# Patient Record
Sex: MALE | Race: WHITE | NOT HISPANIC OR LATINO | Employment: OTHER | URBAN - METROPOLITAN AREA
[De-identification: names, ages, dates, MRNs, and addresses within clinical notes are randomized per-mention and may not be internally consistent; named-entity substitution may affect disease eponyms.]

---

## 2017-05-03 ENCOUNTER — OFFICE VISIT (OUTPATIENT)
Dept: CARDIOLOGY | Facility: CLINIC | Age: 82
End: 2017-05-03

## 2017-05-03 VITALS
SYSTOLIC BLOOD PRESSURE: 132 MMHG | BODY MASS INDEX: 30.32 KG/M2 | HEIGHT: 70 IN | WEIGHT: 211.8 LBS | HEART RATE: 53 BPM | DIASTOLIC BLOOD PRESSURE: 78 MMHG

## 2017-05-03 DIAGNOSIS — I48.0 PAROXYSMAL ATRIAL FIBRILLATION (HCC): Primary | ICD-10-CM

## 2017-05-03 DIAGNOSIS — I10 ESSENTIAL HYPERTENSION: ICD-10-CM

## 2017-05-03 PROCEDURE — 93000 ELECTROCARDIOGRAM COMPLETE: CPT | Performed by: INTERNAL MEDICINE

## 2017-05-03 PROCEDURE — 99213 OFFICE O/P EST LOW 20 MIN: CPT | Performed by: INTERNAL MEDICINE

## 2018-01-09 ENCOUNTER — TELEPHONE (OUTPATIENT)
Dept: PAIN MEDICINE | Facility: CLINIC | Age: 83
End: 2018-01-09

## 2018-01-29 PROBLEM — Z79.01 CHRONIC ANTICOAGULATION: Status: ACTIVE | Noted: 2018-01-29

## 2018-01-29 PROBLEM — G57.11 MERALGIA PARESTHETICA OF RIGHT SIDE: Status: ACTIVE | Noted: 2018-01-29

## 2018-01-29 NOTE — PROGRESS NOTES
"Chief Complaint: \"Pain and burning in my right thigh.\"     History of Present Illness:   Patient: Mr. Dennis Coon, 83 y.o. male   Referring physician: Dr. Jorge   Reason for referral: Consultation for intractable acute right thigh pain.   Pain history: Patient reports a 6-week history of pain, which began without incident. Pain has progressed in intensity over the past 6 weeks. Patient fell landing on his right knee 3 weeks before his thigh pain started.  Pain description: constant pain, described as burning sensation.   Radiation of pain: The pain radiates from the right inguinal region into the anterior aspect of his right thigh up to the knee level. Patient also reports some pain in his right calf that starts when he gets out of bed and resolves after a few hours  Pain intensity today: 7/10  Average pain intensity last week: 8/10  Pain intensity ranges from: 7/10 to 8/10  Aggravating factors: Pain increases with clothing touching area.  Alleviating factors: Pain decreases with removal of article of clothing to the area.   Associated symptoms:   Patient reports weakness in the right thigh and right lower extremity from a prior CVA.   Patient denies any new bladder or bowel problems.   Patient reports difficulties with his balance but denies recent falls.      Review of previous therapies and additional medical records:  Dennis Coon has already failed the following measures, including:   Conservative measures: oral analgesics, topical analgesics, massage, ice and heat   Interventional measures: None  Surgical measures: None  Dennis Coon presents with significant comorbidities including hypertension, arrhythmia and CVA, thyroid disorder, prostate cancer, allergies engaged in treatment.  In terms of current analgesics, Dennis Coon takes: Tylenol. Patient failed a trial with gabapentin   I have reviewed Trey Report # [id number] consistent to medication reconciliation.     Review of " Diagnostic Studies: None    Review of Systems   Constitutional: Positive for activity change.   HENT: Positive for hearing loss and tinnitus.    Musculoskeletal: Positive for arthralgias and gait problem.   All other systems reviewed and are negative.     Patient Active Problem List   Diagnosis   • Paroxysmal atrial fibrillation   • Hypertension   • Diabetes mellitus, type 2   • History of TIA (transient ischemic attack)   • History of CVA (cerebrovascular accident)   • BPH (benign prostatic hypertrophy)   • Hypothyroidism   • On continuous oral anticoagulation   • Meralgia paresthetica of right side   • Chronic anticoagulation   • Diabetes mellitus type 2 in obese   • Osteoarthritis of right hip   • Spondylosis of lumbar region without myelopathy or radiculopathy       Past Medical History:   Diagnosis Date   • BPH (benign prostatic hypertrophy) 9/22/2016   • CVA (cerebral infarction) 9/22/2016   • Diabetes mellitus, type 2 9/22/2016   • Extremity pain    • Hypertension 9/22/2016   • Hypothyroidism 9/22/2016   • Nephrolithiasis    • On continuous oral anticoagulation 9/22/2016     Continue Coumadin.   • Paroxysmal atrial fibrillation 9/22/2016   • TIA (transient ischemic attack) 9/22/2016         Past Surgical History:   Procedure Laterality Date   • CATARACT EXTRACTION     • ESOPHAGEAL DILATATION  1997   • MOUTH SURGERY     • OTHER SURGICAL HISTORY      Squamous cell, occipital area, removal.   • SHOULDER SURGERY Right    • TONSILLECTOMY AND ADENOIDECTOMY     • VASECTOMY  1960         Family History   Problem Relation Age of Onset   • No Known Problems Mother    • No Known Problems Father          Social History     Social History   • Marital status:      Spouse name: N/A   • Number of children: N/A   • Years of education: N/A     Social History Main Topics   • Smoking status: Never Smoker   • Smokeless tobacco: Never Used   • Alcohol use No   • Drug use: No   • Sexual activity: Defer     Other Topics  "Concern   • None     Social History Narrative           Current Outpatient Prescriptions:   •  acetaminophen (TYLENOL) 325 MG tablet, Take 650 mg by mouth every 6 (six) hours as needed for mild pain (1-3)., Disp: , Rfl:   •  amLODIPine (NORVASC) 5 MG tablet, Take 5 mg by mouth daily., Disp: , Rfl:   •  aspirin 81 MG EC tablet, Take 81 mg by mouth daily., Disp: , Rfl:   •  bicalutamide (CASODEX) 50 MG chemo tablet, 50 mg daily., Disp: , Rfl:   •  Fexofenadine HCl (ALLEGRA ALLERGY PO), Take  by mouth as needed., Disp: , Rfl:   •  isosorbide mononitrate (IMDUR) 30 MG 24 hr tablet, Take 30 mg by mouth daily., Disp: , Rfl:   •  leuprolide (LUPRON) 22.5 MG injection, Inject 22.5 mg into the shoulder, thigh, or buttocks every 6 (six) months., Disp: , Rfl:   •  levothyroxine (SYNTHROID, LEVOTHROID) 50 MCG tablet, Take  by mouth., Disp: , Rfl:   •  sotalol (BETAPACE) 80 MG tablet, Take 80 mg by mouth 2 (two) times a day., Disp: , Rfl:   •  tamsulosin (FLOMAX) 0.4 MG capsule 24 hr capsule, Take 1 capsule by mouth every night., Disp: , Rfl:   •  warfarin (COUMADIN) 2 MG tablet, , Disp: , Rfl:   •  warfarin (COUMADIN) 5 MG tablet, Take 5 mg by mouth Daily., Disp: , Rfl:       Allergies   Allergen Reactions   • Actos [Pioglitazone]    • Aggrenox [Aspirin-Dipyridamole Er]    • Contrast Dye    • Diovan [Valsartan]    • Iodine    • Lisinopril    • Statins          /78 (BP Location: Left arm, Patient Position: Sitting)  Pulse 56  Temp 97.8 °F (36.6 °C) (Temporal Artery )   Resp 16  Ht 177.8 cm (70\")  Wt 93.9 kg (207 lb)  SpO2 96%  BMI 29.7 kg/m2      Physical Exam:  Constitutional: Patient is oriented to person, place, and time. Vital signs are normal. Patient appears well-developed and well-nourished.   HENT: Head: Normocephalic and atraumatic. Eyes: Conjunctivae and lids are normal. Pupils: Equal, round, reactive to light.   Neck: Trachea normal. Neck supple. No JVD present.   Pulmonary Respiratory effort: No " increased work of breathing or signs of respiratory distress. Auscultation of lungs: Clear to auscultation.   Cardiovascular Auscultation of heart: Normal rate and rhythm, normal S1 and S2, no murmurs.   Abdomen: The abdomen was soft and nontender. Bowel sounds were normal.   Musculoskeletal   Gait and station: Gait evaluation demonstrated shuffling   Lumbar spine: The range of motion of the lumbar spine is full and without pain. Extension, flexion, lateral flexion, rotation of the lumbar spine did not increase or reproduce pain. Lumbar facet joint loading maneuvers are negative.   Anibal and Gaenslen's tests are negative   Piriformis maneuvers are negative   Palpation of the bilateral ischial tuberosities, unrevealing   Palpation of the bilateral greater trochanter, unrevealing   Examination of the Iliotibial band: unrevealing   Hip joints: The range of motion of the left hip joint is full and without pain, limited on the right to flexion and internal rotation   Neurological: Patient is alert and oriented to person, place, and time. Speech: speech is normal. Cortical function: Normal mental status.   Cranial nerves: Cranial nerves 2-12 intact.   Reflex Scores:  Right patellar: 0+  Left patellar: 0+  Right achilles: 0+  Left achilles: 0+  Motor strength: 5/5, except right leg overall 4/5  Motor Tone: normal tone.   Involuntary movements: none.   Superficial/Primitive Reflexes: primitive reflexes were absent.   Right Lai: absent  Left Lai: absent  Right ankle clonus: absent  Left ankle clonus: absent   Negative long tract signs. Straight leg raising test is negative. Femoral stretch sign is negative.   Sensation: No sensory loss. Sensory exam: intact to light touch, intact pain and temperature sensation, intact vibration sensation and normal proprioception. There is some hyperesthesia in his right thigh  Coordination: Normal finger to nose and heel to shin. Normal balance and negative. Romberg's sign  negative.   Skin and subcutaneous tissue: Skin is warm and intact. No rash noted. No cyanosis.   Psychiatric: Judgment and insight: Normal. Orientation to person, place and time: Normal. Recent and remote memory: Intact. Mood and affect: Normal.     ASSESSMENT:   1. Meralgia paresthetica of right side    2. Osteoarthritis of right hip, unspecified osteoarthritis type    3. Spondylosis of lumbar region without myelopathy or radiculopathy    4. Diabetes mellitus type 2 in obese    5. Paroxysmal atrial fibrillation    6. History of TIA (transient ischemic attack)    7. History of CVA (cerebrovascular accident)    8. On continuous oral anticoagulation      PLAN/MEDICAL DECISION MAKING: I had a lengthy conversation with . Dennis Coon regarding his chronic pain condition and potential therapeutic options including risks, benefits, alternative therapies, to name a few. Patient has failed to obtain pain relief with conservative measures, as referenced above. I have reviewed all available patient's medical records as well as previous therapies as referenced above.  Therefore, I have proposed the following plan:  1. Diagnostic studies:  A. X-rays of the lumbar spine full views with flexion and extension  B. X-rays of the right hip full views  2. Pharmacological measures: Reviewed. Discussed.   A.  Patient takes Tylenol.   B. Trial with prilocaine 2%, lidocaine 10%, imipramine 3%, capsaicin 0.001% and mannitol 20%  cream, apply 1 to 2 grams of cream to the affected areas every 4 to 6 hours as needed  3. Interventional pain management measures: Patient will be scheduled for diagnostic and therapeutic right lateral femoral cutaneous nerve block under ultrasound and fluoroscopic guidance by hydrodissection technique.  In case of any neuraxial blocks, patient will need to stop warfarin (Coumadin) at least 5 days between last dose and procedure and when INR < 1.5  4. Long-term rehabilitation efforts:  A. The patient has a  history of falls. I did complete a risk assessment for falls. Fall precautions: Patient has been instructed regarding universal fall precautions, such as;   · Using gait aids a rolling walker at the appropriate height at all times for ambulation  · Removing all area rugs and coffee tables to create a safe environment at home  · Ensure clean, dry floors  · Wearing supportive footwear and properly fitting clothing  · Ensure bed/chair is appropriate height and patient's feet can touch the floor  · Using a shower transfer bench  · Using walk-in shower and having shower safety bars installed  · Ensure proper lighting, minimize glare  · Have nightlights operational and in use  · Participation in an exercise program for gait training, balance training and strength  · Ensure proper compliance and organization of medications to avoid errors   · Avoid use of over the counter sedatives and alcohol consumption  · Ensure easy access to call bell, glasses, TV control, telephone  · Ensure glasses/hearing aids are in use or close by (on top of night table)  B.   Patient will start a comprehensive physical therapy program for water therapy, core strengthening, gait and balance training, neurodynamics and desensitization techniques   C.  Contrast therapy: Apply ice-packs for 15-20 minutes, followed by heating pads for 15-20 minutes to affected area   D.  Referral to [facility]  5.  The patient and his family have been instructed to contact my office with any questions or difficulties. The patient understands the plan and agrees to proceed accordingly.       Patient Care Team:  Emerita Jorge MD as PCP - General  Nestor Carrillo MD as Consulting Physician (Pain Medicine)  Rambo Mclean MD as Consulting Physician (Cardiology)     New Medications Ordered This Visit   Medications   • levothyroxine (SYNTHROID, LEVOTHROID) 50 MCG tablet     Sig: Take  by mouth.   • warfarin (COUMADIN) 2 MG  tablet   • warfarin (COUMADIN) 5 MG tablet     Sig: Take 5 mg by mouth Daily.         No future appointments.      Nestor Carrillo MD     EMR Dragon/Transcription disclaimer:  Much of this encounter note is an electronic transcription of spoken language to printed text. Electronic transcription of spoken language may permit erroneous, or at times, nonsensical words or phrases to be inadvertently transcribed. Although I have reviewed the note for such errors, some may still exist.

## 2018-01-30 ENCOUNTER — OFFICE VISIT (OUTPATIENT)
Dept: PAIN MEDICINE | Facility: CLINIC | Age: 83
End: 2018-01-30

## 2018-01-30 ENCOUNTER — HOSPITAL ENCOUNTER (OUTPATIENT)
Dept: GENERAL RADIOLOGY | Facility: HOSPITAL | Age: 83
Discharge: HOME | End: 2018-01-30
Attending: ANESTHESIOLOGY | Admitting: ANESTHESIOLOGY

## 2018-01-30 VITALS
DIASTOLIC BLOOD PRESSURE: 78 MMHG | RESPIRATION RATE: 16 BRPM | HEART RATE: 56 BPM | WEIGHT: 207 LBS | TEMPERATURE: 97.8 F | HEIGHT: 70 IN | BODY MASS INDEX: 29.63 KG/M2 | SYSTOLIC BLOOD PRESSURE: 128 MMHG | OXYGEN SATURATION: 96 %

## 2018-01-30 DIAGNOSIS — E11.69 DIABETES MELLITUS TYPE 2 IN OBESE (HCC): ICD-10-CM

## 2018-01-30 DIAGNOSIS — M47.816 SPONDYLOSIS OF LUMBAR REGION WITHOUT MYELOPATHY OR RADICULOPATHY: ICD-10-CM

## 2018-01-30 DIAGNOSIS — E66.9 DIABETES MELLITUS TYPE 2 IN OBESE (HCC): ICD-10-CM

## 2018-01-30 DIAGNOSIS — M16.11 OSTEOARTHRITIS OF RIGHT HIP, UNSPECIFIED OSTEOARTHRITIS TYPE: ICD-10-CM

## 2018-01-30 DIAGNOSIS — G57.11 MERALGIA PARESTHETICA OF RIGHT SIDE: ICD-10-CM

## 2018-01-30 DIAGNOSIS — I48.0 PAROXYSMAL ATRIAL FIBRILLATION (HCC): ICD-10-CM

## 2018-01-30 DIAGNOSIS — Z86.73 HISTORY OF CVA (CEREBROVASCULAR ACCIDENT): ICD-10-CM

## 2018-01-30 DIAGNOSIS — Z79.01 ON CONTINUOUS ORAL ANTICOAGULATION: ICD-10-CM

## 2018-01-30 DIAGNOSIS — Z86.73 HISTORY OF TIA (TRANSIENT ISCHEMIC ATTACK): ICD-10-CM

## 2018-01-30 PROCEDURE — 72114 X-RAY EXAM L-S SPINE BENDING: CPT

## 2018-01-30 PROCEDURE — 99203 OFFICE O/P NEW LOW 30 MIN: CPT | Performed by: ANESTHESIOLOGY

## 2018-01-30 PROCEDURE — 73502 X-RAY EXAM HIP UNI 2-3 VIEWS: CPT

## 2018-01-30 RX ORDER — WARFARIN SODIUM 2 MG/1
TABLET ORAL
Status: ON HOLD | COMMUNITY
Start: 2018-01-10 | End: 2018-09-26

## 2018-01-30 RX ORDER — LEVOTHYROXINE SODIUM 0.05 MG/1
50 TABLET ORAL DAILY
COMMUNITY
Start: 2015-10-02

## 2018-01-30 RX ORDER — WARFARIN SODIUM 5 MG/1
5 TABLET ORAL
Status: ON HOLD | COMMUNITY
End: 2018-09-26

## 2018-01-30 RX ORDER — EMOLLIENT BASE
CREAM (GRAM) TOPICAL
Qty: 30 G | Status: SHIPPED | OUTPATIENT
Start: 2018-01-30

## 2018-02-05 ENCOUNTER — OUTSIDE FACILITY SERVICE (OUTPATIENT)
Dept: PAIN MEDICINE | Facility: CLINIC | Age: 83
End: 2018-02-05

## 2018-02-05 PROCEDURE — 99152 MOD SED SAME PHYS/QHP 5/>YRS: CPT | Performed by: ANESTHESIOLOGY

## 2018-02-05 PROCEDURE — 64450 NJX AA&/STRD OTHER PN/BRANCH: CPT | Performed by: ANESTHESIOLOGY

## 2018-02-05 PROCEDURE — 76942 ECHO GUIDE FOR BIOPSY: CPT | Performed by: ANESTHESIOLOGY

## 2018-09-26 ENCOUNTER — HOSPITAL ENCOUNTER (OUTPATIENT)
Facility: HOSPITAL | Age: 83
Setting detail: OBSERVATION
Discharge: HOME | End: 2018-09-28
Attending: EMERGENCY MEDICINE | Admitting: EMERGENCY MEDICINE

## 2018-09-26 ENCOUNTER — APPOINTMENT (OUTPATIENT)
Dept: GENERAL RADIOLOGY | Facility: HOSPITAL | Age: 83
End: 2018-09-26

## 2018-09-26 ENCOUNTER — APPOINTMENT (OUTPATIENT)
Dept: CARDIOLOGY | Facility: HOSPITAL | Age: 83
End: 2018-09-26

## 2018-09-26 DIAGNOSIS — I48.91 ATRIAL FIBRILLATION, UNSPECIFIED TYPE (HCC): ICD-10-CM

## 2018-09-26 DIAGNOSIS — I21.4 NSTEMI (NON-ST ELEVATED MYOCARDIAL INFARCTION) (HCC): ICD-10-CM

## 2018-09-26 DIAGNOSIS — R07.9 CHEST PAIN, UNSPECIFIED TYPE: Primary | ICD-10-CM

## 2018-09-26 DIAGNOSIS — N28.9 RENAL INSUFFICIENCY: ICD-10-CM

## 2018-09-26 DIAGNOSIS — I48.0 PAROXYSMAL ATRIAL FIBRILLATION (HCC): ICD-10-CM

## 2018-09-26 DIAGNOSIS — E66.9 DIABETES MELLITUS TYPE 2 IN OBESE (HCC): ICD-10-CM

## 2018-09-26 DIAGNOSIS — E11.69 DIABETES MELLITUS TYPE 2 IN OBESE (HCC): ICD-10-CM

## 2018-09-26 PROBLEM — I20.8 ANGINA AT REST (HCC): Status: ACTIVE | Noted: 2018-09-26

## 2018-09-26 PROBLEM — N18.9 CKD (CHRONIC KIDNEY DISEASE): Status: ACTIVE | Noted: 2018-09-26

## 2018-09-26 LAB
ALBUMIN SERPL-MCNC: 4.3 G/DL (ref 3.2–4.8)
ALBUMIN/GLOB SERPL: 1.7 G/DL (ref 1.5–2.5)
ALP SERPL-CCNC: 73 U/L (ref 25–100)
ALT SERPL W P-5'-P-CCNC: 15 U/L (ref 7–40)
ANION GAP SERPL CALCULATED.3IONS-SCNC: 10 MMOL/L (ref 3–11)
APTT PPP: 41.2 SECONDS (ref 24–31)
ARTICHOKE IGE QN: 170 MG/DL (ref 0–130)
AST SERPL-CCNC: 15 U/L (ref 0–33)
BASOPHILS # BLD AUTO: 0.01 10*3/MM3 (ref 0–0.2)
BASOPHILS NFR BLD AUTO: 0.2 % (ref 0–1)
BH CV ECHO MEAS - AO MAX PG (FULL): 22 MMHG
BH CV ECHO MEAS - AO MAX PG: 25 MMHG
BH CV ECHO MEAS - AO MEAN PG (FULL): 13.6 MMHG
BH CV ECHO MEAS - AO MEAN PG: 15.1 MMHG
BH CV ECHO MEAS - AO ROOT AREA (BSA CORRECTED): 1.4
BH CV ECHO MEAS - AO ROOT AREA: 7.1 CM^2
BH CV ECHO MEAS - AO ROOT DIAM: 3 CM
BH CV ECHO MEAS - AO V2 MAX: 249.8 CM/SEC
BH CV ECHO MEAS - AO V2 MEAN: 183.8 CM/SEC
BH CV ECHO MEAS - AO V2 VTI: 63 CM
BH CV ECHO MEAS - AVA(I,A): 1.2 CM^2
BH CV ECHO MEAS - AVA(I,D): 1.2 CM^2
BH CV ECHO MEAS - AVA(V,A): 1.2 CM^2
BH CV ECHO MEAS - AVA(V,D): 1.2 CM^2
BH CV ECHO MEAS - BSA(HAYCOCK): 2.1 M^2
BH CV ECHO MEAS - BSA: 2.1 M^2
BH CV ECHO MEAS - BZI_BMI: 28.7 KILOGRAMS/M^2
BH CV ECHO MEAS - BZI_METRIC_HEIGHT: 177.8 CM
BH CV ECHO MEAS - BZI_METRIC_WEIGHT: 90.7 KG
BH CV ECHO MEAS - EDV(CUBED): 113.5 ML
BH CV ECHO MEAS - EDV(MOD-SP4): 87 ML
BH CV ECHO MEAS - EDV(TEICH): 109.7 ML
BH CV ECHO MEAS - EF(CUBED): 65.5 %
BH CV ECHO MEAS - EF(MOD-BP): 0 %
BH CV ECHO MEAS - EF(MOD-SP4): 66.7 %
BH CV ECHO MEAS - EF(TEICH): 56.9 %
BH CV ECHO MEAS - ESV(CUBED): 39.2 ML
BH CV ECHO MEAS - ESV(MOD-SP4): 29 ML
BH CV ECHO MEAS - ESV(TEICH): 47.3 ML
BH CV ECHO MEAS - FS: 29.8 %
BH CV ECHO MEAS - IVS/LVPW: 0.88
BH CV ECHO MEAS - IVSD: 0.88 CM
BH CV ECHO MEAS - LA DIMENSION: 3.7 CM
BH CV ECHO MEAS - LA/AO: 1.2
BH CV ECHO MEAS - LAD MAJOR: 6 CM
BH CV ECHO MEAS - LAT PEAK E' VEL: 7.3 CM/SEC
BH CV ECHO MEAS - LATERAL E/E' RATIO: 10
BH CV ECHO MEAS - LV DIASTOLIC VOL/BSA (35-75): 41.7 ML/M^2
BH CV ECHO MEAS - LV MASS(C)D: 159.4 GRAMS
BH CV ECHO MEAS - LV MASS(C)DI: 76.4 GRAMS/M^2
BH CV ECHO MEAS - LV MAX PG: 3 MMHG
BH CV ECHO MEAS - LV MEAN PG: 1.5 MMHG
BH CV ECHO MEAS - LV SYSTOLIC VOL/BSA (12-30): 13.9 ML/M^2
BH CV ECHO MEAS - LV V1 MAX: 86.2 CM/SEC
BH CV ECHO MEAS - LV V1 MEAN: 57.5 CM/SEC
BH CV ECHO MEAS - LV V1 VTI: 21.9 CM
BH CV ECHO MEAS - LVIDD: 4.8 CM
BH CV ECHO MEAS - LVIDS: 3.4 CM
BH CV ECHO MEAS - LVLD AP4: 7.3 CM
BH CV ECHO MEAS - LVLS AP4: 6.2 CM
BH CV ECHO MEAS - LVOT AREA (M): 3.5 CM^2
BH CV ECHO MEAS - LVOT AREA: 3.4 CM^2
BH CV ECHO MEAS - LVOT DIAM: 2.1 CM
BH CV ECHO MEAS - LVPWD: 1 CM
BH CV ECHO MEAS - MED PEAK E' VEL: 6.8 CM/SEC
BH CV ECHO MEAS - MEDIAL E/E' RATIO: 10.8
BH CV ECHO MEAS - MV A MAX VEL: 91.8 CM/SEC
BH CV ECHO MEAS - MV DEC TIME: 0.29 SEC
BH CV ECHO MEAS - MV E MAX VEL: 75.3 CM/SEC
BH CV ECHO MEAS - MV E/A: 0.82
BH CV ECHO MEAS - PA ACC SLOPE: 494.2 CM/SEC^2
BH CV ECHO MEAS - PA ACC TIME: 0.15 SEC
BH CV ECHO MEAS - PA MAX PG: 6.7 MMHG
BH CV ECHO MEAS - PA PR(ACCEL): 12.5 MMHG
BH CV ECHO MEAS - PA V2 MAX: 129.2 CM/SEC
BH CV ECHO MEAS - RAP SYSTOLE: 3 MMHG
BH CV ECHO MEAS - RVSP: 23 MMHG
BH CV ECHO MEAS - SI(AO): 215.6 ML/M^2
BH CV ECHO MEAS - SI(CUBED): 35.6 ML/M^2
BH CV ECHO MEAS - SI(LVOT): 35.7 ML/M^2
BH CV ECHO MEAS - SI(MOD-SP4): 27.8 ML/M^2
BH CV ECHO MEAS - SI(TEICH): 29.9 ML/M^2
BH CV ECHO MEAS - SV(AO): 450.1 ML
BH CV ECHO MEAS - SV(CUBED): 74.3 ML
BH CV ECHO MEAS - SV(LVOT): 74.5 ML
BH CV ECHO MEAS - SV(MOD-SP4): 58 ML
BH CV ECHO MEAS - SV(TEICH): 62.4 ML
BH CV ECHO MEAS - TR MAX PG: 20 MMHG
BH CV ECHO MEAS - TR MAX VEL: 223.9 CM/SEC
BH CV ECHO MEASUREMENTS AVERAGE E/E' RATIO: 10.68
BH CV VAS BP RIGHT ARM: NORMAL MMHG
BILIRUB SERPL-MCNC: 0.3 MG/DL (ref 0.3–1.2)
BNP SERPL-MCNC: 101 PG/ML (ref 0–100)
BUN BLD-MCNC: 41 MG/DL (ref 9–23)
BUN/CREAT SERPL: 24 (ref 7–25)
CALCIUM SPEC-SCNC: 9.6 MG/DL (ref 8.7–10.4)
CHLORIDE SERPL-SCNC: 107 MMOL/L (ref 99–109)
CHOLEST SERPL-MCNC: 241 MG/DL (ref 0–200)
CO2 SERPL-SCNC: 20 MMOL/L (ref 20–31)
CREAT BLD-MCNC: 1.71 MG/DL (ref 0.6–1.3)
DEPRECATED RDW RBC AUTO: 44.7 FL (ref 37–54)
EOSINOPHIL # BLD AUTO: 0 10*3/MM3 (ref 0–0.3)
EOSINOPHIL NFR BLD AUTO: 0 % (ref 0–3)
ERYTHROCYTE [DISTWIDTH] IN BLOOD BY AUTOMATED COUNT: 13.4 % (ref 11.3–14.5)
GFR SERPL CREATININE-BSD FRML MDRD: 38 ML/MIN/1.73
GLOBULIN UR ELPH-MCNC: 2.6 GM/DL
GLUCOSE BLD-MCNC: 267 MG/DL (ref 70–100)
GLUCOSE BLDC GLUCOMTR-MCNC: 207 MG/DL (ref 70–130)
GLUCOSE BLDC GLUCOMTR-MCNC: 215 MG/DL (ref 70–130)
HBA1C MFR BLD: 7.2 % (ref 4.8–5.6)
HCT VFR BLD AUTO: 33.8 % (ref 38.9–50.9)
HDLC SERPL-MCNC: 41 MG/DL (ref 40–60)
HGB BLD-MCNC: 11.4 G/DL (ref 13.1–17.5)
HOLD SPECIMEN: NORMAL
HOLD SPECIMEN: NORMAL
IMM GRANULOCYTES # BLD: 0.02 10*3/MM3 (ref 0–0.03)
IMM GRANULOCYTES NFR BLD: 0.3 % (ref 0–0.6)
INR PPP: 2.1 (ref 0.91–1.09)
INR PPP: 2.1 (ref 0.91–1.09)
LEFT ATRIUM VOLUME INDEX: 33 ML/M^2
LEFT ATRIUM VOLUME: 70 CM3
LIPASE SERPL-CCNC: 22 U/L (ref 6–51)
LYMPHOCYTES # BLD AUTO: 0.42 10*3/MM3 (ref 0.6–4.8)
LYMPHOCYTES NFR BLD AUTO: 6.5 % (ref 24–44)
MAXIMAL PREDICTED HEART RATE: 137 BPM
MCH RBC QN AUTO: 31 PG (ref 27–31)
MCHC RBC AUTO-ENTMCNC: 33.7 G/DL (ref 32–36)
MCV RBC AUTO: 91.8 FL (ref 80–99)
MONOCYTES # BLD AUTO: 0.19 10*3/MM3 (ref 0–1)
MONOCYTES NFR BLD AUTO: 3 % (ref 0–12)
NEUTROPHILS # BLD AUTO: 5.8 10*3/MM3 (ref 1.5–8.3)
NEUTROPHILS NFR BLD AUTO: 90.3 % (ref 41–71)
PLATELET # BLD AUTO: 237 10*3/MM3 (ref 150–450)
PMV BLD AUTO: 10.2 FL (ref 6–12)
POTASSIUM BLD-SCNC: 4.4 MMOL/L (ref 3.5–5.5)
PROT SERPL-MCNC: 6.9 G/DL (ref 5.7–8.2)
PROTHROMBIN TIME: 22.1 SECONDS (ref 9.6–11.5)
PROTHROMBIN TIME: 22.1 SECONDS (ref 9.6–11.5)
RBC # BLD AUTO: 3.68 10*6/MM3 (ref 4.2–5.76)
SODIUM BLD-SCNC: 137 MMOL/L (ref 132–146)
STRESS TARGET HR: 116 BPM
TRIGL SERPL-MCNC: 162 MG/DL (ref 0–150)
TROPONIN I SERPL-MCNC: 0 NG/ML (ref 0–0.07)
TROPONIN I SERPL-MCNC: 0 NG/ML (ref 0–0.07)
TROPONIN I SERPL-MCNC: 0.1 NG/ML
TROPONIN I SERPL-MCNC: 0.85 NG/ML
TROPONIN I SERPL-MCNC: 1.86 NG/ML
WBC NRBC COR # BLD: 6.42 10*3/MM3 (ref 3.5–10.8)
WHOLE BLOOD HOLD SPECIMEN: NORMAL
WHOLE BLOOD HOLD SPECIMEN: NORMAL

## 2018-09-26 PROCEDURE — 96374 THER/PROPH/DIAG INJ IV PUSH: CPT

## 2018-09-26 PROCEDURE — 85610 PROTHROMBIN TIME: CPT | Performed by: NURSE PRACTITIONER

## 2018-09-26 PROCEDURE — 93005 ELECTROCARDIOGRAM TRACING: CPT

## 2018-09-26 PROCEDURE — 93303 ECHO TRANSTHORACIC: CPT | Performed by: INTERNAL MEDICINE

## 2018-09-26 PROCEDURE — 25010000002 MORPHINE SULFATE (PF) 2 MG/ML SOLUTION: Performed by: INTERNAL MEDICINE

## 2018-09-26 PROCEDURE — 82962 GLUCOSE BLOOD TEST: CPT

## 2018-09-26 PROCEDURE — 84484 ASSAY OF TROPONIN QUANT: CPT

## 2018-09-26 PROCEDURE — G0378 HOSPITAL OBSERVATION PER HR: HCPCS

## 2018-09-26 PROCEDURE — 96376 TX/PRO/DX INJ SAME DRUG ADON: CPT

## 2018-09-26 PROCEDURE — 63710000001 INSULIN LISPRO (HUMAN) PER 5 UNITS: Performed by: INTERNAL MEDICINE

## 2018-09-26 PROCEDURE — 83036 HEMOGLOBIN GLYCOSYLATED A1C: CPT | Performed by: INTERNAL MEDICINE

## 2018-09-26 PROCEDURE — 80053 COMPREHEN METABOLIC PANEL: CPT | Performed by: EMERGENCY MEDICINE

## 2018-09-26 PROCEDURE — 83690 ASSAY OF LIPASE: CPT | Performed by: EMERGENCY MEDICINE

## 2018-09-26 PROCEDURE — 99285 EMERGENCY DEPT VISIT HI MDM: CPT

## 2018-09-26 PROCEDURE — 99220 PR INITIAL OBSERVATION CARE/DAY 70 MINUTES: CPT | Performed by: INTERNAL MEDICINE

## 2018-09-26 PROCEDURE — 84484 ASSAY OF TROPONIN QUANT: CPT | Performed by: INTERNAL MEDICINE

## 2018-09-26 PROCEDURE — 96375 TX/PRO/DX INJ NEW DRUG ADDON: CPT

## 2018-09-26 PROCEDURE — 25010000002 ONDANSETRON PER 1 MG: Performed by: NURSE PRACTITIONER

## 2018-09-26 PROCEDURE — 25010000002 MORPHINE SULFATE (PF) 2 MG/ML SOLUTION: Performed by: EMERGENCY MEDICINE

## 2018-09-26 PROCEDURE — 93306 TTE W/DOPPLER COMPLETE: CPT

## 2018-09-26 PROCEDURE — 93325 DOPPLER ECHO COLOR FLOW MAPG: CPT | Performed by: INTERNAL MEDICINE

## 2018-09-26 PROCEDURE — 80061 LIPID PANEL: CPT | Performed by: INTERNAL MEDICINE

## 2018-09-26 PROCEDURE — 99223 1ST HOSP IP/OBS HIGH 75: CPT | Performed by: INTERNAL MEDICINE

## 2018-09-26 PROCEDURE — 93005 ELECTROCARDIOGRAM TRACING: CPT | Performed by: EMERGENCY MEDICINE

## 2018-09-26 PROCEDURE — 83880 ASSAY OF NATRIURETIC PEPTIDE: CPT | Performed by: EMERGENCY MEDICINE

## 2018-09-26 PROCEDURE — 93005 ELECTROCARDIOGRAM TRACING: CPT | Performed by: INTERNAL MEDICINE

## 2018-09-26 PROCEDURE — 71045 X-RAY EXAM CHEST 1 VIEW: CPT

## 2018-09-26 PROCEDURE — 85730 THROMBOPLASTIN TIME PARTIAL: CPT | Performed by: NURSE PRACTITIONER

## 2018-09-26 PROCEDURE — 85025 COMPLETE CBC W/AUTO DIFF WBC: CPT | Performed by: EMERGENCY MEDICINE

## 2018-09-26 PROCEDURE — 93010 ELECTROCARDIOGRAM REPORT: CPT | Performed by: INTERNAL MEDICINE

## 2018-09-26 PROCEDURE — 93320 DOPPLER ECHO COMPLETE: CPT | Performed by: INTERNAL MEDICINE

## 2018-09-26 RX ORDER — SOTALOL HYDROCHLORIDE 80 MG/1
80 TABLET ORAL EVERY 12 HOURS SCHEDULED
Status: DISCONTINUED | OUTPATIENT
Start: 2018-09-26 | End: 2018-09-28

## 2018-09-26 RX ORDER — NICOTINE POLACRILEX 4 MG
15 LOZENGE BUCCAL
Status: DISCONTINUED | OUTPATIENT
Start: 2018-09-26 | End: 2018-09-28 | Stop reason: HOSPADM

## 2018-09-26 RX ORDER — NICOTINE POLACRILEX 4 MG
15 LOZENGE BUCCAL
Status: DISCONTINUED | OUTPATIENT
Start: 2018-09-26 | End: 2018-09-27

## 2018-09-26 RX ORDER — CLOPIDOGREL BISULFATE 75 MG/1
75 TABLET ORAL DAILY
Status: DISCONTINUED | OUTPATIENT
Start: 2018-09-26 | End: 2018-09-28 | Stop reason: HOSPADM

## 2018-09-26 RX ORDER — SODIUM CHLORIDE 0.9 % (FLUSH) 0.9 %
10 SYRINGE (ML) INJECTION AS NEEDED
Status: DISCONTINUED | OUTPATIENT
Start: 2018-09-26 | End: 2018-09-28 | Stop reason: HOSPADM

## 2018-09-26 RX ORDER — NALOXONE HCL 0.4 MG/ML
0.4 VIAL (ML) INJECTION
Status: DISCONTINUED | OUTPATIENT
Start: 2018-09-26 | End: 2018-09-28 | Stop reason: HOSPADM

## 2018-09-26 RX ORDER — ALUMINA, MAGNESIA, AND SIMETHICONE 2400; 2400; 240 MG/30ML; MG/30ML; MG/30ML
15 SUSPENSION ORAL ONCE
Status: COMPLETED | OUTPATIENT
Start: 2018-09-26 | End: 2018-09-26

## 2018-09-26 RX ORDER — PANTOPRAZOLE SODIUM 40 MG/10ML
40 INJECTION, POWDER, LYOPHILIZED, FOR SOLUTION INTRAVENOUS
Status: DISCONTINUED | OUTPATIENT
Start: 2018-09-26 | End: 2018-09-27

## 2018-09-26 RX ORDER — DEXTROSE MONOHYDRATE 25 G/50ML
25 INJECTION, SOLUTION INTRAVENOUS
Status: DISCONTINUED | OUTPATIENT
Start: 2018-09-26 | End: 2018-09-28 | Stop reason: HOSPADM

## 2018-09-26 RX ORDER — ACETAMINOPHEN 325 MG/1
650 TABLET ORAL EVERY 6 HOURS PRN
Status: DISCONTINUED | OUTPATIENT
Start: 2018-09-26 | End: 2018-09-28 | Stop reason: HOSPADM

## 2018-09-26 RX ORDER — MORPHINE SULFATE 2 MG/ML
2 INJECTION, SOLUTION INTRAMUSCULAR; INTRAVENOUS ONCE
Status: COMPLETED | OUTPATIENT
Start: 2018-09-26 | End: 2018-09-26

## 2018-09-26 RX ORDER — WARFARIN SODIUM 5 MG/1
5 TABLET ORAL SEE ADMIN INSTRUCTIONS
COMMUNITY
End: 2018-09-28 | Stop reason: HOSPADM

## 2018-09-26 RX ORDER — SODIUM CHLORIDE 0.9 % (FLUSH) 0.9 %
1-10 SYRINGE (ML) INJECTION AS NEEDED
Status: DISCONTINUED | OUTPATIENT
Start: 2018-09-26 | End: 2018-09-28 | Stop reason: HOSPADM

## 2018-09-26 RX ORDER — MORPHINE SULFATE 2 MG/ML
2 INJECTION, SOLUTION INTRAMUSCULAR; INTRAVENOUS EVERY 4 HOURS PRN
Status: DISCONTINUED | OUTPATIENT
Start: 2018-09-26 | End: 2018-09-28 | Stop reason: HOSPADM

## 2018-09-26 RX ORDER — ISOSORBIDE MONONITRATE 30 MG/1
30 TABLET, EXTENDED RELEASE ORAL DAILY
Status: DISCONTINUED | OUTPATIENT
Start: 2018-09-26 | End: 2018-09-26

## 2018-09-26 RX ORDER — ASPIRIN 81 MG/1
81 TABLET ORAL DAILY
Status: DISCONTINUED | OUTPATIENT
Start: 2018-09-26 | End: 2018-09-26

## 2018-09-26 RX ORDER — GLIPIZIDE 5 MG/1
2.5 TABLET ORAL
Status: DISCONTINUED | OUTPATIENT
Start: 2018-09-27 | End: 2018-09-26

## 2018-09-26 RX ORDER — ASPIRIN 81 MG/1
324 TABLET, CHEWABLE ORAL ONCE
Status: DISCONTINUED | OUTPATIENT
Start: 2018-09-26 | End: 2018-09-26

## 2018-09-26 RX ORDER — ONDANSETRON 2 MG/ML
4 INJECTION INTRAMUSCULAR; INTRAVENOUS ONCE
Status: COMPLETED | OUTPATIENT
Start: 2018-09-26 | End: 2018-09-26

## 2018-09-26 RX ORDER — WARFARIN SODIUM 5 MG/1
5 TABLET ORAL
Status: DISCONTINUED | OUTPATIENT
Start: 2018-09-26 | End: 2018-09-28 | Stop reason: HOSPADM

## 2018-09-26 RX ORDER — DEXTROSE MONOHYDRATE 25 G/50ML
25 INJECTION, SOLUTION INTRAVENOUS
Status: DISCONTINUED | OUTPATIENT
Start: 2018-09-26 | End: 2018-09-27

## 2018-09-26 RX ORDER — AMLODIPINE BESYLATE 5 MG/1
5 TABLET ORAL DAILY
Status: DISCONTINUED | OUTPATIENT
Start: 2018-09-26 | End: 2018-09-26

## 2018-09-26 RX ORDER — PANTOPRAZOLE SODIUM 40 MG/10ML
40 INJECTION, POWDER, LYOPHILIZED, FOR SOLUTION INTRAVENOUS
Status: DISCONTINUED | OUTPATIENT
Start: 2018-09-26 | End: 2018-09-26

## 2018-09-26 RX ORDER — WARFARIN SODIUM 4 MG/1
4 TABLET ORAL SEE ADMIN INSTRUCTIONS
COMMUNITY
End: 2018-09-28 | Stop reason: HOSPADM

## 2018-09-26 RX ORDER — AMLODIPINE BESYLATE 10 MG/1
10 TABLET ORAL DAILY
Status: DISCONTINUED | OUTPATIENT
Start: 2018-09-27 | End: 2018-09-28 | Stop reason: HOSPADM

## 2018-09-26 RX ORDER — ISOSORBIDE MONONITRATE 60 MG/1
60 TABLET, EXTENDED RELEASE ORAL DAILY
Status: DISCONTINUED | OUTPATIENT
Start: 2018-09-27 | End: 2018-09-28 | Stop reason: HOSPADM

## 2018-09-26 RX ORDER — LEVOTHYROXINE SODIUM 0.05 MG/1
50 TABLET ORAL
Status: DISCONTINUED | OUTPATIENT
Start: 2018-09-26 | End: 2018-09-28 | Stop reason: HOSPADM

## 2018-09-26 RX ADMIN — SOTALOL HYDROCHLORIDE 80 MG: 80 TABLET ORAL at 08:09

## 2018-09-26 RX ADMIN — SOTALOL HYDROCHLORIDE 80 MG: 80 TABLET ORAL at 20:27

## 2018-09-26 RX ADMIN — ASPIRIN 81 MG: 81 TABLET, COATED ORAL at 08:09

## 2018-09-26 RX ADMIN — WARFARIN SODIUM 5 MG: 5 TABLET ORAL at 17:57

## 2018-09-26 RX ADMIN — CLOPIDOGREL BISULFATE 75 MG: 75 TABLET ORAL at 13:05

## 2018-09-26 RX ADMIN — INSULIN LISPRO 3 UNITS: 100 INJECTION, SOLUTION INTRAVENOUS; SUBCUTANEOUS at 20:27

## 2018-09-26 RX ADMIN — LEVOTHYROXINE SODIUM 50 MCG: 50 TABLET ORAL at 08:09

## 2018-09-26 RX ADMIN — ONDANSETRON 4 MG: 2 INJECTION INTRAMUSCULAR; INTRAVENOUS at 05:36

## 2018-09-26 RX ADMIN — LIDOCAINE HYDROCHLORIDE 15 ML: 20 SOLUTION ORAL; TOPICAL at 04:45

## 2018-09-26 RX ADMIN — ISOSORBIDE MONONITRATE 30 MG: 30 TABLET, EXTENDED RELEASE ORAL at 08:09

## 2018-09-26 RX ADMIN — NITROGLYCERIN 0.5 INCH: 20 OINTMENT TOPICAL at 03:58

## 2018-09-26 RX ADMIN — ALUMINUM HYDROXIDE, MAGNESIUM HYDROXIDE, AND DIMETHICONE 15 ML: 400; 400; 40 SUSPENSION ORAL at 04:45

## 2018-09-26 RX ADMIN — MORPHINE SULFATE 2 MG: 2 INJECTION, SOLUTION INTRAMUSCULAR; INTRAVENOUS at 06:59

## 2018-09-26 RX ADMIN — PANTOPRAZOLE SODIUM 40 MG: 40 INJECTION, POWDER, FOR SOLUTION INTRAVENOUS at 05:38

## 2018-09-26 RX ADMIN — MORPHINE SULFATE 2 MG: 2 INJECTION, SOLUTION INTRAMUSCULAR; INTRAVENOUS at 05:34

## 2018-09-27 LAB
ANION GAP SERPL CALCULATED.3IONS-SCNC: 10 MMOL/L (ref 3–11)
BUN BLD-MCNC: 35 MG/DL (ref 9–23)
BUN/CREAT SERPL: 22.6 (ref 7–25)
CALCIUM SPEC-SCNC: 9.1 MG/DL (ref 8.7–10.4)
CHLORIDE SERPL-SCNC: 107 MMOL/L (ref 99–109)
CO2 SERPL-SCNC: 23 MMOL/L (ref 20–31)
CREAT BLD-MCNC: 1.55 MG/DL (ref 0.6–1.3)
GFR SERPL CREATININE-BSD FRML MDRD: 43 ML/MIN/1.73
GLUCOSE BLD-MCNC: 134 MG/DL (ref 70–100)
GLUCOSE BLDC GLUCOMTR-MCNC: 143 MG/DL (ref 70–130)
GLUCOSE BLDC GLUCOMTR-MCNC: 171 MG/DL (ref 70–130)
GLUCOSE BLDC GLUCOMTR-MCNC: 185 MG/DL (ref 70–130)
GLUCOSE BLDC GLUCOMTR-MCNC: 237 MG/DL (ref 70–130)
INR PPP: 2.01 (ref 0.91–1.09)
POTASSIUM BLD-SCNC: 4 MMOL/L (ref 3.5–5.5)
PROTHROMBIN TIME: 21.1 SECONDS (ref 9.6–11.5)
SODIUM BLD-SCNC: 140 MMOL/L (ref 132–146)
TROPONIN I SERPL-MCNC: 1.29 NG/ML

## 2018-09-27 PROCEDURE — 93010 ELECTROCARDIOGRAM REPORT: CPT | Performed by: INTERNAL MEDICINE

## 2018-09-27 PROCEDURE — G0378 HOSPITAL OBSERVATION PER HR: HCPCS

## 2018-09-27 PROCEDURE — 99226 PR SBSQ OBSERVATION CARE/DAY 35 MINUTES: CPT | Performed by: HOSPITALIST

## 2018-09-27 PROCEDURE — 80048 BASIC METABOLIC PNL TOTAL CA: CPT | Performed by: INTERNAL MEDICINE

## 2018-09-27 PROCEDURE — 82962 GLUCOSE BLOOD TEST: CPT

## 2018-09-27 PROCEDURE — 84484 ASSAY OF TROPONIN QUANT: CPT | Performed by: INTERNAL MEDICINE

## 2018-09-27 PROCEDURE — 99214 OFFICE O/P EST MOD 30 MIN: CPT | Performed by: INTERNAL MEDICINE

## 2018-09-27 PROCEDURE — 93005 ELECTROCARDIOGRAM TRACING: CPT | Performed by: PHYSICIAN ASSISTANT

## 2018-09-27 PROCEDURE — 96376 TX/PRO/DX INJ SAME DRUG ADON: CPT

## 2018-09-27 PROCEDURE — 85610 PROTHROMBIN TIME: CPT | Performed by: NURSE PRACTITIONER

## 2018-09-27 RX ORDER — PANTOPRAZOLE SODIUM 40 MG/1
40 TABLET, DELAYED RELEASE ORAL
Status: DISCONTINUED | OUTPATIENT
Start: 2018-09-28 | End: 2018-09-28 | Stop reason: HOSPADM

## 2018-09-27 RX ADMIN — SOTALOL HYDROCHLORIDE 80 MG: 80 TABLET ORAL at 20:41

## 2018-09-27 RX ADMIN — SOTALOL HYDROCHLORIDE 80 MG: 80 TABLET ORAL at 08:57

## 2018-09-27 RX ADMIN — WARFARIN SODIUM 5 MG: 5 TABLET ORAL at 18:25

## 2018-09-27 RX ADMIN — CLOPIDOGREL BISULFATE 75 MG: 75 TABLET ORAL at 08:57

## 2018-09-27 RX ADMIN — INSULIN LISPRO 3 UNITS: 100 INJECTION, SOLUTION INTRAVENOUS; SUBCUTANEOUS at 20:41

## 2018-09-27 RX ADMIN — INSULIN LISPRO 2 UNITS: 100 INJECTION, SOLUTION INTRAVENOUS; SUBCUTANEOUS at 12:36

## 2018-09-27 RX ADMIN — LEVOTHYROXINE SODIUM 50 MCG: 50 TABLET ORAL at 05:29

## 2018-09-27 RX ADMIN — INSULIN LISPRO 2 UNITS: 100 INJECTION, SOLUTION INTRAVENOUS; SUBCUTANEOUS at 17:48

## 2018-09-27 RX ADMIN — ISOSORBIDE MONONITRATE 60 MG: 60 TABLET, EXTENDED RELEASE ORAL at 08:57

## 2018-09-27 RX ADMIN — PANTOPRAZOLE SODIUM 40 MG: 40 INJECTION, POWDER, FOR SOLUTION INTRAVENOUS at 05:29

## 2018-09-28 ENCOUNTER — TELEPHONE (OUTPATIENT)
Dept: PHARMACY | Facility: HOSPITAL | Age: 83
End: 2018-09-28

## 2018-09-28 VITALS
TEMPERATURE: 97.8 F | DIASTOLIC BLOOD PRESSURE: 70 MMHG | BODY MASS INDEX: 28.63 KG/M2 | WEIGHT: 200 LBS | SYSTOLIC BLOOD PRESSURE: 140 MMHG | HEART RATE: 54 BPM | OXYGEN SATURATION: 95 % | HEIGHT: 70 IN | RESPIRATION RATE: 24 BRPM

## 2018-09-28 LAB
GLUCOSE BLDC GLUCOMTR-MCNC: 148 MG/DL (ref 70–130)
GLUCOSE BLDC GLUCOMTR-MCNC: 239 MG/DL (ref 70–130)
INR PPP: 2.09 (ref 0.91–1.09)
PROTHROMBIN TIME: 21.9 SECONDS (ref 9.6–11.5)

## 2018-09-28 PROCEDURE — 93005 ELECTROCARDIOGRAM TRACING: CPT | Performed by: INTERNAL MEDICINE

## 2018-09-28 PROCEDURE — G0378 HOSPITAL OBSERVATION PER HR: HCPCS

## 2018-09-28 PROCEDURE — 93010 ELECTROCARDIOGRAM REPORT: CPT | Performed by: INTERNAL MEDICINE

## 2018-09-28 PROCEDURE — 99214 OFFICE O/P EST MOD 30 MIN: CPT | Performed by: INTERNAL MEDICINE

## 2018-09-28 PROCEDURE — 85610 PROTHROMBIN TIME: CPT | Performed by: NURSE PRACTITIONER

## 2018-09-28 PROCEDURE — 82962 GLUCOSE BLOOD TEST: CPT

## 2018-09-28 PROCEDURE — 99217 PR OBSERVATION CARE DISCHARGE MANAGEMENT: CPT | Performed by: NURSE PRACTITIONER

## 2018-09-28 RX ORDER — WARFARIN SODIUM 5 MG/1
5 TABLET ORAL NIGHTLY
Qty: 30 TABLET | Refills: 0 | Status: SHIPPED | OUTPATIENT
Start: 2018-09-28 | End: 2018-12-27 | Stop reason: HOSPADM

## 2018-09-28 RX ORDER — SOTALOL HYDROCHLORIDE 80 MG/1
80 TABLET ORAL
Status: DISCONTINUED | OUTPATIENT
Start: 2018-09-28 | End: 2018-09-28 | Stop reason: HOSPADM

## 2018-09-28 RX ORDER — CLOPIDOGREL BISULFATE 75 MG/1
75 TABLET ORAL DAILY
Qty: 30 TABLET | Refills: 0 | Status: SHIPPED | OUTPATIENT
Start: 2018-09-29

## 2018-09-28 RX ORDER — SOTALOL HYDROCHLORIDE 80 MG/1
80 TABLET ORAL
Qty: 30 TABLET | Refills: 0 | Status: SHIPPED | OUTPATIENT
Start: 2018-09-29 | End: 2018-12-27 | Stop reason: HOSPADM

## 2018-09-28 RX ORDER — AMLODIPINE BESYLATE 10 MG/1
10 TABLET ORAL DAILY
Qty: 30 TABLET | Refills: 0 | Status: SHIPPED | OUTPATIENT
Start: 2018-09-29 | End: 2018-12-27 | Stop reason: HOSPADM

## 2018-09-28 RX ORDER — PANTOPRAZOLE SODIUM 40 MG/1
40 TABLET, DELAYED RELEASE ORAL DAILY
Qty: 30 TABLET | Refills: 0 | Status: SHIPPED | OUTPATIENT
Start: 2018-09-28

## 2018-09-28 RX ORDER — ISOSORBIDE MONONITRATE 60 MG/1
60 TABLET, EXTENDED RELEASE ORAL DAILY
Qty: 30 TABLET | Refills: 0 | Status: SHIPPED | OUTPATIENT
Start: 2018-09-29 | End: 2018-10-11 | Stop reason: SDUPTHER

## 2018-09-28 RX ADMIN — AMLODIPINE BESYLATE 10 MG: 10 TABLET ORAL at 08:57

## 2018-09-28 RX ADMIN — INSULIN LISPRO 3 UNITS: 100 INJECTION, SOLUTION INTRAVENOUS; SUBCUTANEOUS at 12:09

## 2018-09-28 RX ADMIN — PANTOPRAZOLE SODIUM 40 MG: 40 TABLET, DELAYED RELEASE ORAL at 06:06

## 2018-09-28 RX ADMIN — SOTALOL HYDROCHLORIDE 80 MG: 80 TABLET ORAL at 08:57

## 2018-09-28 RX ADMIN — LEVOTHYROXINE SODIUM 50 MCG: 50 TABLET ORAL at 06:06

## 2018-09-28 RX ADMIN — CLOPIDOGREL BISULFATE 75 MG: 75 TABLET ORAL at 08:57

## 2018-09-28 RX ADMIN — ISOSORBIDE MONONITRATE 60 MG: 60 TABLET, EXTENDED RELEASE ORAL at 08:57

## 2018-09-29 ENCOUNTER — READMISSION MANAGEMENT (OUTPATIENT)
Dept: CALL CENTER | Facility: HOSPITAL | Age: 83
End: 2018-09-29

## 2018-09-29 NOTE — OUTREACH NOTE
Prep Survey      Responses   Facility patient discharged from?  [place]   Is patient eligible?  Yes   Discharge diagnosis  NSTEMI (non-ST elevated myocardial infarction)   Does the patient have one of the following disease processes/diagnoses(primary or secondary)?  Acute MI (STEMI,NSTEMI)   Does the patient have Home health ordered?  No   Is there a DME ordered?  No   Prep survey completed?  Yes          Namita Deleon RN

## 2018-10-01 NOTE — TELEPHONE ENCOUNTER
Dr. Oneal, please see above. Mr. Coon's warfarin is currently being managed by his PCP, so we will defer further management to his office. Thank you for the referral -- please let us know if we can be of any further assistance.

## 2018-10-02 ENCOUNTER — READMISSION MANAGEMENT (OUTPATIENT)
Dept: CALL CENTER | Facility: HOSPITAL | Age: 83
End: 2018-10-02

## 2018-10-02 NOTE — OUTREACH NOTE
AMI Week 1 Survey      Responses   Facility patient discharged from?  [place]   Does the patient have one of the following disease processes/diagnoses(primary or secondary)?  Acute MI (STEMI,NSTEMI)   Is there a successful TCM telephone encounter documented?  No   Week 1 attempt successful?  Yes   Call start time  1220   Call end time  1225   Discharge diagnosis  NSTEMI (non-ST elevated myocardial infarction)   Is patient permission given to speak with other caregiver?  Yes   List who call center can speak with  Wife   Meds reviewed with patient/caregiver?  Yes   Is the patient having any side effects they believe may be caused by any medication additions or changes?  No   Does the patient have all prescriptions related to this admission filled (includes statins,anticoagulants,HTN meds,anti-arrhythmia meds)  Yes   Is the patient taking all medications as directed (includes completed medication regime)?  Yes   Medication comments  Pt states wife, who is a nurse, takes care of his meds for him   Does the patient have a primary care provider?   Yes   Does the patient have an appointment with their PCP,cardiologist,or clinic within 7 days of discharge?  N/A   Has the patient kept scheduled appointments due by today?  N/A   Comments  Pt not sure when appt is--his wife takes crae of this for him and she was not available.   Has home health visited the patient within 72 hours of discharge?  N/A   Psychosocial issues?  No   Did the patient receive a copy of their discharge instructions?  Yes   Nursing interventions  Reviewed instructions with patient   What is the patient's perception of their health status since discharge?  Improving   Nursing interventions  Nurse provided patient education   Is the patient/caregiver able to teach back signs and symptoms of when to call for help immediately:  Sudden chest discomfort, Sudden discomfort in arms, back, neck or jaw, Shortness of breath at any time, Sudden sweating or clammy  skin, Nausea or vomiting, Dizziness or lightheadedness, Irregular or rapid heart rate   Nursing interventions  Nurse provided patient education   Is the pateint /caregiver able to teach back the importance of cardiac rehab?  Yes   Nursing interventions  Provided education on importance of cardiac rehab [pt will ask Cards about cardiac rehab and may go in [place].]   Is the patient/caregiver able to teach back lifestyle changes to help prevent MIs  Regular exercise as approved by provider, Heart healthy diet, Maintaining a healthy weight, Managing diabetes, Reducing stress   Is the patient/caregiver able to teach back ways to prevent a second heart attack:  Take medications, Follow up with MD, Participate in Cardiac Rehab, Manage risk factors   Is the patient/caregiver able to teach back the hierarchy of who to call/visit for symptoms/problems? PCP, Specialist, Home health nurse, Urgent Care, ED, 911  Yes   Additional teach back comments  Pt states he is doing well and taking his meds (wife keeps track of this for him). No CP noted.   Week 1 call completed?  Yes          Ashley Vazquez RN

## 2018-10-09 ENCOUNTER — READMISSION MANAGEMENT (OUTPATIENT)
Dept: CALL CENTER | Facility: HOSPITAL | Age: 83
End: 2018-10-09

## 2018-10-09 NOTE — OUTREACH NOTE
AMI Week 2 Survey      Responses   Facility patient discharged from?  [place]   Does the patient have one of the following disease processes/diagnoses(primary or secondary)?  Acute MI (STEMI,NSTEMI)   Week 2 attempt successful?  Yes   Call start time  1658   Call end time  1703   Discharge diagnosis  NSTEMI (non-ST elevated myocardial infarction)   Person spoke with today (if not patient) and relationship  wife   Meds reviewed with patient/caregiver?  Yes   Is the patient taking all medications as directed (includes completed medication regime)?  Yes   Has the patient kept scheduled appointments due by today?  Yes   Psychosocial issues?  No   Comments  Pt has intermittently having chest discomfort in epigatric area when lying down but resolves when sitting up. He did not want to call MD>    What is the patient's perception of their health status since discharge?  New symptoms unrelated to diagnosis   Is the patient/caregiver able to teach back signs and symptoms of when to call for help immediately:  Sudden chest discomfort, Shortness of breath at any time, Nausea or vomiting, Dizziness or lightheadedness   Is the patient/caregiver able to teach back lifestyle changes to help prevent MIs  Heart healthy diet   Is the patient/caregiver able to teach back ways to prevent a second heart attack:  Take medications, Follow up with MD   Is the patient/caregiver able to teach back the hierarchy of who to call/visit for symptoms/problems? PCP, Specialist, Home health nurse, Urgent Care, ED, 911  Yes   Week 2 call completed?  Yes          Yany Koroma RN

## 2018-10-11 ENCOUNTER — TELEPHONE (OUTPATIENT)
Dept: CARDIOLOGY | Facility: CLINIC | Age: 83
End: 2018-10-11

## 2018-10-11 RX ORDER — ISOSORBIDE MONONITRATE 120 MG/1
120 TABLET, EXTENDED RELEASE ORAL DAILY
Qty: 30 TABLET | Refills: 6 | Status: SHIPPED | OUTPATIENT
Start: 2018-10-11 | End: 2018-11-13 | Stop reason: SDUPTHER

## 2018-10-11 NOTE — TELEPHONE ENCOUNTER
Spoke with patient's wife, and per [facility] recommendations, patient is to increase Imdur from 60 mg daily to 100 my daily. Patient's wife verbalized understanding.    Told patient's wife that if the patient was to experience chest pain, he can take 1 nitroglycerin Sublingual every 5 minutes up to 3 times. If pain doesn't subside after the 3rd dose, patient is to go to the hospital. Patient's wife verbalized understanding.

## 2018-10-11 NOTE — TELEPHONE ENCOUNTER
I increased his Imdur to 120mg daily from 60mg.  Let's have him try that and if not helping call us back.  If he has chest pain that does not resolve with SL nitro (can take up to 3 doses, 5 minutes apart) he should come back to the hospital to be evaluated.  I made the change to his Imdur orders.

## 2018-10-11 NOTE — TELEPHONE ENCOUNTER
Spoke with patient's wife. Patient complains of continued chest pain.    Patient is fine during the day, at night around midnight, the patient experiences chest discomfort relieved with nitro (1 tablet).    Patient denies nausea, vomiting, sweats, shoulder pain in shoulder/arms/jaw.    Around 1:30am, pain returns, but relieved with 1 nitro.    This is happening every night, within the last 4 days. Patient does have a hiatal hernia, but his wife is not sure if this pain is related.

## 2018-10-18 ENCOUNTER — READMISSION MANAGEMENT (OUTPATIENT)
Dept: CALL CENTER | Facility: HOSPITAL | Age: 83
End: 2018-10-18

## 2018-10-18 NOTE — OUTREACH NOTE
AMI Week 3 Survey      Responses   Facility patient discharged from?  [place]   Does the patient have one of the following disease processes/diagnoses(primary or secondary)?  Acute MI (STEMI,NSTEMI)   Week 3 attempt successful?  Yes   Call start time  1118   Call end time  1121   Discharge diagnosis  NSTEMI (non-ST elevated myocardial infarction)   Meds reviewed with patient/caregiver?  Yes   Is the patient having any side effects they believe may be caused by any medication additions or changes?  No   Does the patient have all prescriptions related to this admission filled (includes statins,anticoagulants,HTN meds,anti-arrhythmia meds)  Yes   Is the patient taking all medications as directed (includes completed medication regime)?  Yes   Medication comments  Nothing new since D/C.   Does the patient have a primary care provider?   Yes   Has the patient kept scheduled appointments due by today?  Yes   Comments  Has seen PCP twice.   Has home health visited the patient within 72 hours of discharge?  N/A   Psychosocial issues?  No   Comments  No further pain he states.   Did the patient receive a copy of their discharge instructions?  Yes   Nursing interventions  Reviewed instructions with patient   What is the patient's perception of their health status since discharge?  Improving   Nursing interventions  Nurse provided patient education   Is the patient/caregiver able to teach back signs and symptoms of when to call for help immediately:  Sudden sweating or clammy skin, Dizziness or lightheadedness, Sudden discomfort in arms, back, neck or jaw, Sudden chest discomfort, Shortness of breath at any time, Nausea or vomiting, Irregular or rapid heart rate   Nursing interventions  Nurse provided patient education   Is the pateint /caregiver able to teach back the importance of cardiac rehab?  Yes   Nursing interventions  Provided education on importance of cardiac rehab   Is the patient/caregiver able to teach back  lifestyle changes to help prevent MIs  Heart healthy diet, Maintaining a healthy weight, Regular exercise as approved by provider   Is the patient/caregiver able to teach back ways to prevent a second heart attack:  Take medications, Participate in Cardiac Rehab, Follow up with MD, Manage risk factors   Is the patient/caregiver able to teach back the hierarchy of who to call/visit for symptoms/problems? PCP, Specialist, Home health nurse, Urgent Care, ED, 911  Yes   Additional teach back comments  He isn't sure of next appt but says his wife is and they will discuss Rehab then.   Week 3 call completed?  Yes          Venecia Aguilar RN

## 2018-10-26 ENCOUNTER — READMISSION MANAGEMENT (OUTPATIENT)
Dept: CALL CENTER | Facility: HOSPITAL | Age: 83
End: 2018-10-26

## 2018-10-26 NOTE — OUTREACH NOTE
AMI Week 4 Survey      Responses   Facility patient discharged from?  [place]   Does the patient have one of the following disease processes/diagnoses(primary or secondary)?  Acute MI (STEMI,NSTEMI)   Week 4 attempt successful?  Yes   Call start time  1105   Call end time  1113   Discharge diagnosis  NSTEMI (non-ST elevated myocardial infarction)   Person spoke with today (if not patient) and relationship  Monique, wife   Meds reviewed with patient/caregiver?  Yes   Is the patient taking all medications as directed (includes completed medication regime)?  Yes   Medication comments  Has had one episode chest pain relieved with one NTG   Has the patient kept scheduled appointments due by today?  Yes   Comments  Cardiology 11/01   Is the patient still receiving Home Health Services?  N/A   Psychosocial issues?  No   What is the patient's perception of their health status since discharge?  Improving   Nursing interventions  Nurse provided patient education   Is the patient/caregiver able to teach back signs and symptoms of when to call for help immediately:  Sudden chest discomfort, Sudden discomfort in arms, back, neck or jaw, Shortness of breath at any time, Sudden sweating or clammy skin, Nausea or vomiting, Dizziness or lightheadedness, Irregular or rapid heart rate   Nursing interventions  Nurse provided patient education   Nursing interventions  -- [Has not started cardiac rehab he doesn't walk very well and not able to lift weights.  They will discuss with MD at appt next week.]   Is the patient/caregiver able to teach back ways to prevent a second heart attack:  Take medications, Follow up with MD, Manage risk factors   Additional teach back comments  Reviewed NTG use.   Week 4 call completed?  Yes   Would the patient like one additional call?  No   Graduated  Yes   Did the patient feel the follow up calls were helpful during their recovery period?  Yes   Was the number of calls appropriate?  Yes           Annetta Dowell, RN

## 2018-11-02 NOTE — PROGRESS NOTES
[facility] at [facility]  Follow Up Visit  Dennis Coon  1934  [phone]    VISIT DATE:  11/13/18    PCP:   Emerita Jorge MD  [address] [place] [address] [address] [place] [address]      CC:  Follow up CAD      Problem List:  1.  CAD with recent NSTEMI 9/2018 medically managed  .  Echo 9/26/18      Left ventricular systolic function is normal.  · Estimated EF appears to be in the range of 56 - 60%.  · Left ventricular diastolic (grade I) consistent with impaired relaxation.  · Aortic valve area is 1.2 cm2.  · Mild aortic valve regurgitation is present.  · There is moderate calcification of the aortic valve  · Mild-moderate Aortic stenosis  · Aortic valve maximum pressure gradient is 25 mmHg. Aortic valve mean pressure gradient is 15.1 mmHg            Small patent foramen ovale present.  2.  Paroxysmal Atrial Fibrillation              A.  Diagnosed 2008 after CVA              B.  Abnormal Lexiscan Cardiolite June 2013:  Inferior Ischemia, EF 63%              C.  Echo 8/6/2014:  EF 70%, no valvular abnormalities, diastolic dysfunction noted.  3.  Diabetes Mellitus II  4.  Cerebrovascular Disease              A.  Lacunar CVA 2007              B.  PFO noted by MAXX, 2007.  Small atrial septal aneurysm with evidence of right-to-left shunting.   Patient    declined PFO closure at that time.              C.  TIA 2008.  Afib noted at that time.                5.  Hypothyroidism  6.  Seasonal Allergies/Rhinitis  7.  BPH  8.  H/O Nephrolithiais  9.  Surgical history              A.  Esophageal dilatation, 1997.               B.  Oral surgery.               C.  Vasectomy, 1960.               D.  Tonsils and adenoids.               E.  Right shoulder surgery.               F.  Squamous cell, occipital area, removal.               G.  Cataract surgery         History of Present Illness:  Dennis Coon  Is a 83 y.o. male with pertinent cardiac history detailed above.  He returns for his first  follow-up after hospitalization September 2018.  We did increase his Imdur dose as an outpatient because he did have recurrent chest pain.  He has not had to be hospitalized again for angina.   Did take one sublingual nitroglycerin for an episode of substernal chest pain which lasted 5 minutes last night he had immediate relief.  His EKG in clinic today shows T-wave inversions in the inferior leads, but there is no significant new changes compared to his hospitalization in September.  His blood pressure was mildly elevated today but was controlled at his PCPs office and at home per his wife.  He has history of A. fib but remains in sinus rhythm on sotalol.  He otherwise has no new complaints today.  He and his wife are generally pleased with how his been doing since he was discharged.      Patient Active Problem List    Diagnosis Date Noted   • Angina at rest (CMS/HCC) 09/26/2018   • NSTEMI (non-ST elevated myocardial infarction) (CMS/HCC) 09/26/2018   • CKD (chronic kidney disease) 09/26/2018   • Diabetes mellitus type 2 in obese (CMS/HCC) 01/30/2018   • Osteoarthritis of right hip 01/30/2018   • Spondylosis of lumbar region without myelopathy or radiculopathy 01/30/2018   • Meralgia paresthetica of right side 01/29/2018   • Chronic anticoagulation 01/29/2018   • Paroxysmal atrial fibrillation (CMS/[facility]) 09/22/2016     Note Last Updated: 9/22/2016     a. Diagnosed in 2008, after CVA, April 2007.   b. Initiation of Coumadin and sotalol, 2008.   c. Abnormal Lexiscan Cardiolite stress test, June 2013, inferior ischemia. EF 63%.   d. Echocardiogram, 08/06/2014, EF 70%, normal left ventricular systolic function, diastolic dysfunction, no significant valvular abnormalities.         • Hypertension 09/22/2016   • Diabetes mellitus, type 2 (CMS/[facility]) 09/22/2016   • History of TIA (transient ischemic attack) 09/22/2016     Note Last Updated: 9/22/2016 2008     • History of CVA (cerebrovascular accident) 09/22/2016      Note Last Updated: 9/22/2016     Lacunar CVA, 2007,  e. PFO on MAXX, 2007: EF 50% to 60%, small atrial septal aneurysm and evidence of right-to-left shunt.  Patient declined PFO closure at the time.        • BPH (benign prostatic hypertrophy) 09/22/2016   • Hypothyroidism 09/22/2016   • On continuous oral anticoagulation 09/22/2016     Note Last Updated: 9/22/2016      Continue Coumadin.         Allergies   Allergen Reactions   • Actos [Pioglitazone]    • Aggrenox [Aspirin-Dipyridamole Er]    • Contrast Dye    • Diovan [Valsartan]    • Iodine    • Lisinopril    • Statins        Social History     Socioeconomic History   • Marital status:      Spouse name: Not on file   • Number of children: Not on file   • Years of education: Not on file   • Highest education level: Not on file   Social Needs   • Financial resource strain: Not on file   • Food insecurity - worry: Not on file   • Food insecurity - inability: Not on file   • Transportation needs - medical: Not on file   • Transportation needs - non-medical: Not on file   Occupational History   • Not on file   Tobacco Use   • Smoking status: Never Smoker   • Smokeless tobacco: Never Used   Substance and Sexual Activity   • Alcohol use: No   • Drug use: No   • Sexual activity: Defer   Other Topics Concern   • Not on file   Social History Narrative   • Not on file       Family History   Problem Relation Age of Onset   • No Known Problems Mother    • No Known Problems Father        Current Medications:    Current Outpatient Medications:   •  acetaminophen (TYLENOL) 325 MG tablet, Take 650 mg by mouth every 6 (six) hours as needed for mild pain (1-3)., Disp: , Rfl:   •  amLODIPine (NORVASC) 10 MG tablet, Take 1 tablet by mouth Daily., Disp: 30 tablet, Rfl: 0  •  clopidogrel (PLAVIX) 75 MG tablet, Take 1 tablet by mouth Daily., Disp: 30 tablet, Rfl: 0  •  Cream Base cream, CAPSAICIN 0.001%, IMIPRAMINE 3%, LIDOCAINE 10%, MANNITOL 20%, MELOXICAM 0.1%, PRILOCAINE 2%  "APPLY 1-2 GRAMS 3-4 TIMES DAILY, Disp: 30 g, Rfl: PRN  •  Fexofenadine HCl (ALLEGRA ALLERGY PO), Take 1 tablet by mouth As Needed., Disp: , Rfl:   •  isosorbide mononitrate (IMDUR) 120 MG 24 hr tablet, Take 1 tablet by mouth Daily., Disp: 90 tablet, Rfl: 6  •  levothyroxine (SYNTHROID, LEVOTHROID) 50 MCG tablet, Take 50 mcg by mouth Daily., Disp: , Rfl:   •  nitroglycerin (NITROSTAT) 0.4 MG SL tablet, Place 0.4 mg under the tongue Every 5 (Five) Minutes As Needed for Chest Pain. Take no more than 3 doses in 15 minutes., Disp: , Rfl:   •  pantoprazole (PROTONIX) 40 MG EC tablet, Take 1 tablet by mouth Daily., Disp: 30 tablet, Rfl: 0  •  sotalol (BETAPACE) 80 MG tablet, Take 1 tablet by mouth Daily., Disp: 30 tablet, Rfl: 0  •  warfarin (COUMADIN) 5 MG tablet, Take 1 tablet by mouth Every Night., Disp: 30 tablet, Rfl: 0  •  ranolazine (RANEXA) 500 MG 12 hr tablet, Take 1 tablet by mouth Every 12 (Twelve) Hours., Disp: 60 tablet, Rfl: 6     Review of Systems   Cardiovascular: Positive for chest pain and leg swelling. Negative for irregular heartbeat, orthopnea, paroxysmal nocturnal dyspnea and syncope.   All other systems reviewed and are negative.      Vitals:    11/13/18 1411   BP: 150/68   BP Location: Right arm   Patient Position: Sitting   Pulse: 58   SpO2: 97%   Weight: 94.1 kg (207 lb 6.4 oz)   Height: 182.9 cm (72\")       Physical Exam   Constitutional: He is oriented to person, place, and time. He appears well-developed and well-nourished.   Uses walker   HENT:   Head: Normocephalic and atraumatic.   Cardiovascular: Regular rhythm. Bradycardia present.   Murmur heard.   Crescendo systolic murmur is present with a grade of 3/6.  Murmur consistent with known aortic stenosis  No carotid bruits   Pulmonary/Chest: Effort normal and breath sounds normal. No respiratory distress.   Abdominal: There is no tenderness.   Musculoskeletal: He exhibits edema (trace LE).   Neurological: He is alert and oriented to person, " place, and time.   Vitals reviewed.      Diagnostic Data:    ECG 12 Lead  Date/Time: 11/13/2018 3:06 PM  Performed by: Siddhartha Oneal MD  Authorized by: Siddhartha Oneal MD   Comparison: compared with previous ECG from 9/28/2018  Rhythm: sinus bradycardia  Rate: bradycardic  BPM: 58  T depression: III and aVF  QRS axis: left  Clinical impression: abnormal ECG  Clinical impression comment:             Lab Results   Component Value Date    TRIG 162 (H) 09/26/2018    HDL 41 09/26/2018     Lab Results   Component Value Date    GLUCOSE 134 (H) 09/27/2018    BUN 35 (H) 09/27/2018    CREATININE 1.55 (H) 09/27/2018     09/27/2018    K 4.0 09/27/2018     09/27/2018    CO2 23.0 09/27/2018     Lab Results   Component Value Date    HGBA1C 7.20 (H) 09/26/2018     Lab Results   Component Value Date    WBC 6.42 09/26/2018    HGB 11.4 (L) 09/26/2018    HCT 33.8 (L) 09/26/2018     09/26/2018       Assessment:   Diagnosis Plan   1. Paroxysmal atrial fibrillation (CMS/[facility])  ECG 12 Lead   2. Coronary artery disease of native artery of native heart with stable angina pectoris (CMS/[facility])  ECG 12 Lead       Plan:      1.  CAD/ NSTEMI September 2018  -continue medical management with Plavix, isosorbide, amlodipine  -Patient has declined any invasive procedures.  -had one episode of break through angina, start Ranexa 500mg BID  -Has listed statin allergy or intolerance so as not currently on this therapy  -given overall goals of care defer PSCK9 at this time  -echo EF 55-60%, mild-mod AS, normal wall motion  -on sotalol for A. fib which reduced to once daily dosing given his renal function, given baseline bradycardia have not started  additional beta blocker    2.  Essential Hypertension    - continue isosorbide and amlodipine  -elevated in clinic but home log shows good control     3. Paroxysmal Atrial Fibrillation   -  Maintaining NSR on Sotalol. continue sotalol  80 mg once daily  -  On warfarin  for stroke prevention, his PCP follows  INR therapeutic on last check     4.  Mild-moderate aortic stenosis  -Continue to monitor  -stable     Follow up 3 months, sooner as needed        Siddhartha Oneal MD

## 2018-11-13 ENCOUNTER — OFFICE VISIT (OUTPATIENT)
Dept: CARDIOLOGY | Facility: CLINIC | Age: 83
End: 2018-11-13

## 2018-11-13 VITALS
WEIGHT: 207.4 LBS | HEIGHT: 72 IN | DIASTOLIC BLOOD PRESSURE: 68 MMHG | HEART RATE: 58 BPM | BODY MASS INDEX: 28.09 KG/M2 | OXYGEN SATURATION: 97 % | SYSTOLIC BLOOD PRESSURE: 150 MMHG

## 2018-11-13 DIAGNOSIS — I48.0 PAROXYSMAL ATRIAL FIBRILLATION (HCC): Primary | ICD-10-CM

## 2018-11-13 DIAGNOSIS — I25.118 CORONARY ARTERY DISEASE OF NATIVE ARTERY OF NATIVE HEART WITH STABLE ANGINA PECTORIS (HCC): ICD-10-CM

## 2018-11-13 PROCEDURE — 99213 OFFICE O/P EST LOW 20 MIN: CPT | Performed by: INTERNAL MEDICINE

## 2018-11-13 PROCEDURE — 93000 ELECTROCARDIOGRAM COMPLETE: CPT | Performed by: INTERNAL MEDICINE

## 2018-11-13 RX ORDER — NITROGLYCERIN 0.4 MG/1
0.4 TABLET SUBLINGUAL
COMMUNITY

## 2018-11-13 RX ORDER — ISOSORBIDE MONONITRATE 120 MG/1
120 TABLET, EXTENDED RELEASE ORAL DAILY
Qty: 90 TABLET | Refills: 6 | Status: SHIPPED | OUTPATIENT
Start: 2018-11-13 | End: 2018-12-27 | Stop reason: HOSPADM

## 2018-11-13 RX ORDER — RANOLAZINE 500 MG/1
500 TABLET, EXTENDED RELEASE ORAL EVERY 12 HOURS SCHEDULED
Qty: 60 TABLET | Refills: 6 | Status: SHIPPED | OUTPATIENT
Start: 2018-11-13 | End: 2018-12-27 | Stop reason: HOSPADM

## 2018-11-28 ENCOUNTER — TELEPHONE (OUTPATIENT)
Dept: CARDIOLOGY | Facility: CLINIC | Age: 83
End: 2018-11-28

## 2018-11-28 NOTE — TELEPHONE ENCOUNTER
Spoke with patient's wife regarding concerns about Ranexa. Patient has not tolerated Ranexa well. Patient's wife states that he has had increasing angina.     They stopped the Ranexa approximately 4 days ago, and he has had no chest pain since.     They want to know if they can permanently stop the Ranexa or should it be replaced it something else?

## 2018-11-28 NOTE — TELEPHONE ENCOUNTER
If he was not tolerating it and feels better off of it we can stop it and try not replacing it with anything right now.  Let them know to call us if he is having recurrent chest pain or taking frequent SL nitro.

## 2018-11-28 NOTE — TELEPHONE ENCOUNTER
Recommendations per NSK:    If he was not tolerating it and feels better off of it we can stop it and try not replacing it with anything right now.  Let them know to call us if he is having recurrent chest pain or taking frequent SL nitro.      Patient's wife verbalized understanding

## 2018-12-17 ENCOUNTER — HOSPITAL ENCOUNTER (INPATIENT)
Facility: HOSPITAL | Age: 83
LOS: 2 days | End: 2018-12-19
Attending: EMERGENCY MEDICINE | Admitting: INTERNAL MEDICINE

## 2018-12-17 ENCOUNTER — APPOINTMENT (OUTPATIENT)
Dept: GENERAL RADIOLOGY | Facility: HOSPITAL | Age: 83
End: 2018-12-17

## 2018-12-17 DIAGNOSIS — R94.31 ABNORMAL EKG: ICD-10-CM

## 2018-12-17 DIAGNOSIS — R07.9 CHEST PAIN, UNSPECIFIED TYPE: Primary | ICD-10-CM

## 2018-12-17 PROBLEM — R07.89 CHEST PAIN, ATYPICAL: Status: ACTIVE | Noted: 2018-09-26

## 2018-12-17 LAB
ALBUMIN SERPL-MCNC: 4.4 G/DL (ref 3.2–4.8)
ALBUMIN/GLOB SERPL: 1.8 G/DL (ref 1.5–2.5)
ALP SERPL-CCNC: 100 U/L (ref 25–100)
ALT SERPL W P-5'-P-CCNC: 12 U/L (ref 7–40)
ANION GAP SERPL CALCULATED.3IONS-SCNC: 12 MMOL/L (ref 3–11)
AST SERPL-CCNC: 12 U/L (ref 0–33)
BASOPHILS # BLD AUTO: 0.04 10*3/MM3 (ref 0–0.2)
BASOPHILS NFR BLD AUTO: 0.5 % (ref 0–1)
BILIRUB SERPL-MCNC: 0.4 MG/DL (ref 0.3–1.2)
BNP SERPL-MCNC: 80 PG/ML (ref 0–100)
BUN BLD-MCNC: 34 MG/DL (ref 9–23)
BUN/CREAT SERPL: 20.5 (ref 7–25)
CALCIUM SPEC-SCNC: 9.7 MG/DL (ref 8.7–10.4)
CHLORIDE SERPL-SCNC: 108 MMOL/L (ref 99–109)
CO2 SERPL-SCNC: 20 MMOL/L (ref 20–31)
CREAT BLD-MCNC: 1.66 MG/DL (ref 0.6–1.3)
DEPRECATED RDW RBC AUTO: 46.4 FL (ref 37–54)
EOSINOPHIL # BLD AUTO: 0.17 10*3/MM3 (ref 0–0.3)
EOSINOPHIL NFR BLD AUTO: 2.3 % (ref 0–3)
ERYTHROCYTE [DISTWIDTH] IN BLOOD BY AUTOMATED COUNT: 13.5 % (ref 11.3–14.5)
GFR SERPL CREATININE-BSD FRML MDRD: 40 ML/MIN/1.73
GLOBULIN UR ELPH-MCNC: 2.4 GM/DL
GLUCOSE BLD-MCNC: 248 MG/DL (ref 70–100)
GLUCOSE BLDC GLUCOMTR-MCNC: 140 MG/DL (ref 70–130)
GLUCOSE BLDC GLUCOMTR-MCNC: 183 MG/DL (ref 70–130)
GLUCOSE BLDC GLUCOMTR-MCNC: 222 MG/DL (ref 70–130)
GLUCOSE BLDC GLUCOMTR-MCNC: 252 MG/DL (ref 70–130)
HCT VFR BLD AUTO: 33.3 % (ref 38.9–50.9)
HGB BLD-MCNC: 10.9 G/DL (ref 13.1–17.5)
HOLD SPECIMEN: NORMAL
HOLD SPECIMEN: NORMAL
IMM GRANULOCYTES # BLD: 0.06 10*3/MM3 (ref 0–0.03)
IMM GRANULOCYTES NFR BLD: 0.8 % (ref 0–0.6)
INR PPP: 2.55 (ref 0.91–1.09)
LIPASE SERPL-CCNC: 25 U/L (ref 6–51)
LYMPHOCYTES # BLD AUTO: 1.04 10*3/MM3 (ref 0.6–4.8)
LYMPHOCYTES NFR BLD AUTO: 14 % (ref 24–44)
MCH RBC QN AUTO: 30.6 PG (ref 27–31)
MCHC RBC AUTO-ENTMCNC: 32.7 G/DL (ref 32–36)
MCV RBC AUTO: 93.5 FL (ref 80–99)
MONOCYTES # BLD AUTO: 0.56 10*3/MM3 (ref 0–1)
MONOCYTES NFR BLD AUTO: 7.5 % (ref 0–12)
NEUTROPHILS # BLD AUTO: 5.61 10*3/MM3 (ref 1.5–8.3)
NEUTROPHILS NFR BLD AUTO: 75.7 % (ref 41–71)
PLATELET # BLD AUTO: 311 10*3/MM3 (ref 150–450)
PMV BLD AUTO: 9.5 FL (ref 6–12)
POTASSIUM BLD-SCNC: 4.8 MMOL/L (ref 3.5–5.5)
PROT SERPL-MCNC: 6.8 G/DL (ref 5.7–8.2)
PROTHROMBIN TIME: 26.8 SECONDS (ref 9.6–11.5)
RBC # BLD AUTO: 3.56 10*6/MM3 (ref 4.2–5.76)
SODIUM BLD-SCNC: 140 MMOL/L (ref 132–146)
TROPONIN I SERPL-MCNC: 0 NG/ML (ref 0–0.07)
TROPONIN I SERPL-MCNC: 0 NG/ML (ref 0–0.07)
TROPONIN I SERPL-MCNC: 0.63 NG/ML
TROPONIN I SERPL-MCNC: 19.43 NG/ML
TROPONIN I SERPL-MCNC: 32.46 NG/ML
WBC NRBC COR # BLD: 7.42 10*3/MM3 (ref 3.5–10.8)
WHOLE BLOOD HOLD SPECIMEN: NORMAL
WHOLE BLOOD HOLD SPECIMEN: NORMAL

## 2018-12-17 PROCEDURE — 25010000002 HYDROMORPHONE PER 4 MG: Performed by: INTERNAL MEDICINE

## 2018-12-17 PROCEDURE — 63710000001 DIPHENHYDRAMINE PER 50 MG: Performed by: INTERNAL MEDICINE

## 2018-12-17 PROCEDURE — 63710000001 PREDNISONE PER 5 MG: Performed by: INTERNAL MEDICINE

## 2018-12-17 PROCEDURE — 93005 ELECTROCARDIOGRAM TRACING: CPT | Performed by: EMERGENCY MEDICINE

## 2018-12-17 PROCEDURE — 85610 PROTHROMBIN TIME: CPT | Performed by: EMERGENCY MEDICINE

## 2018-12-17 PROCEDURE — 80053 COMPREHEN METABOLIC PANEL: CPT | Performed by: EMERGENCY MEDICINE

## 2018-12-17 PROCEDURE — 71045 X-RAY EXAM CHEST 1 VIEW: CPT

## 2018-12-17 PROCEDURE — 63710000001 PREDNISONE PER 1 MG: Performed by: INTERNAL MEDICINE

## 2018-12-17 PROCEDURE — 82962 GLUCOSE BLOOD TEST: CPT

## 2018-12-17 PROCEDURE — 85025 COMPLETE CBC W/AUTO DIFF WBC: CPT | Performed by: EMERGENCY MEDICINE

## 2018-12-17 PROCEDURE — 99222 1ST HOSP IP/OBS MODERATE 55: CPT | Performed by: INTERNAL MEDICINE

## 2018-12-17 PROCEDURE — 63710000001 INSULIN LISPRO (HUMAN) PER 5 UNITS: Performed by: INTERNAL MEDICINE

## 2018-12-17 PROCEDURE — 99285 EMERGENCY DEPT VISIT HI MDM: CPT

## 2018-12-17 PROCEDURE — 84484 ASSAY OF TROPONIN QUANT: CPT | Performed by: NURSE PRACTITIONER

## 2018-12-17 PROCEDURE — 25010000002 HYDROMORPHONE 1 MG/ML SOLUTION: Performed by: EMERGENCY MEDICINE

## 2018-12-17 PROCEDURE — 93010 ELECTROCARDIOGRAM REPORT: CPT | Performed by: INTERNAL MEDICINE

## 2018-12-17 PROCEDURE — 71111 X-RAY EXAM RIBS/CHEST4/> VWS: CPT

## 2018-12-17 PROCEDURE — 83690 ASSAY OF LIPASE: CPT | Performed by: EMERGENCY MEDICINE

## 2018-12-17 PROCEDURE — 84484 ASSAY OF TROPONIN QUANT: CPT | Performed by: INTERNAL MEDICINE

## 2018-12-17 PROCEDURE — 99223 1ST HOSP IP/OBS HIGH 75: CPT | Performed by: INTERNAL MEDICINE

## 2018-12-17 PROCEDURE — 25010000002 FENTANYL CITRATE (PF) 100 MCG/2ML SOLUTION: Performed by: EMERGENCY MEDICINE

## 2018-12-17 PROCEDURE — 94640 AIRWAY INHALATION TREATMENT: CPT

## 2018-12-17 PROCEDURE — 25010000002 FUROSEMIDE PER 20 MG: Performed by: INTERNAL MEDICINE

## 2018-12-17 PROCEDURE — 94799 UNLISTED PULMONARY SVC/PX: CPT

## 2018-12-17 PROCEDURE — 83880 ASSAY OF NATRIURETIC PEPTIDE: CPT | Performed by: EMERGENCY MEDICINE

## 2018-12-17 PROCEDURE — 93005 ELECTROCARDIOGRAM TRACING: CPT | Performed by: INTERNAL MEDICINE

## 2018-12-17 PROCEDURE — 84484 ASSAY OF TROPONIN QUANT: CPT

## 2018-12-17 RX ORDER — AMLODIPINE BESYLATE 10 MG/1
10 TABLET ORAL DAILY
Status: DISCONTINUED | OUTPATIENT
Start: 2018-12-17 | End: 2018-12-18

## 2018-12-17 RX ORDER — PANTOPRAZOLE SODIUM 40 MG/1
40 TABLET, DELAYED RELEASE ORAL DAILY
Status: DISCONTINUED | OUTPATIENT
Start: 2018-12-17 | End: 2018-12-19 | Stop reason: HOSPADM

## 2018-12-17 RX ORDER — ALUMINA, MAGNESIA, AND SIMETHICONE 2400; 2400; 240 MG/30ML; MG/30ML; MG/30ML
15 SUSPENSION ORAL ONCE
Status: COMPLETED | OUTPATIENT
Start: 2018-12-17 | End: 2018-12-17

## 2018-12-17 RX ORDER — FENTANYL CITRATE 50 UG/ML
50 INJECTION, SOLUTION INTRAMUSCULAR; INTRAVENOUS
Status: DISCONTINUED | OUTPATIENT
Start: 2018-12-17 | End: 2018-12-19 | Stop reason: HOSPADM

## 2018-12-17 RX ORDER — LEVOTHYROXINE SODIUM 0.05 MG/1
50 TABLET ORAL
Status: DISCONTINUED | OUTPATIENT
Start: 2018-12-17 | End: 2018-12-19 | Stop reason: HOSPADM

## 2018-12-17 RX ORDER — SOTALOL HYDROCHLORIDE 80 MG/1
80 TABLET ORAL
Status: DISCONTINUED | OUTPATIENT
Start: 2018-12-17 | End: 2018-12-18

## 2018-12-17 RX ORDER — CLOPIDOGREL BISULFATE 75 MG/1
75 TABLET ORAL DAILY
Status: DISCONTINUED | OUTPATIENT
Start: 2018-12-17 | End: 2018-12-19 | Stop reason: HOSPADM

## 2018-12-17 RX ORDER — NICOTINE POLACRILEX 4 MG
15 LOZENGE BUCCAL
Status: DISCONTINUED | OUTPATIENT
Start: 2018-12-17 | End: 2018-12-19

## 2018-12-17 RX ORDER — ASPIRIN 81 MG/1
324 TABLET, CHEWABLE ORAL ONCE
Status: DISCONTINUED | OUTPATIENT
Start: 2018-12-17 | End: 2018-12-18 | Stop reason: ALTCHOICE

## 2018-12-17 RX ORDER — SODIUM CHLORIDE 0.9 % (FLUSH) 0.9 %
10 SYRINGE (ML) INJECTION AS NEEDED
Status: DISCONTINUED | OUTPATIENT
Start: 2018-12-17 | End: 2018-12-19

## 2018-12-17 RX ORDER — MORPHINE SULFATE 4 MG/ML
4 INJECTION, SOLUTION INTRAMUSCULAR; INTRAVENOUS ONCE
Status: DISCONTINUED | OUTPATIENT
Start: 2018-12-17 | End: 2018-12-17

## 2018-12-17 RX ORDER — FENTANYL CITRATE 50 UG/ML
50 INJECTION, SOLUTION INTRAMUSCULAR; INTRAVENOUS ONCE
Status: COMPLETED | OUTPATIENT
Start: 2018-12-17 | End: 2018-12-17

## 2018-12-17 RX ORDER — SODIUM CHLORIDE 0.9 % (FLUSH) 0.9 %
3-10 SYRINGE (ML) INJECTION AS NEEDED
Status: DISCONTINUED | OUTPATIENT
Start: 2018-12-17 | End: 2018-12-19 | Stop reason: HOSPADM

## 2018-12-17 RX ORDER — HYDROMORPHONE HYDROCHLORIDE 1 MG/ML
0.5 INJECTION, SOLUTION INTRAMUSCULAR; INTRAVENOUS; SUBCUTANEOUS
Status: DISCONTINUED | OUTPATIENT
Start: 2018-12-17 | End: 2018-12-19 | Stop reason: HOSPADM

## 2018-12-17 RX ORDER — WARFARIN SODIUM 4 MG/1
4 TABLET ORAL SEE ADMIN INSTRUCTIONS
COMMUNITY
End: 2018-12-27 | Stop reason: HOSPADM

## 2018-12-17 RX ORDER — DEXTROSE MONOHYDRATE 25 G/50ML
25 INJECTION, SOLUTION INTRAVENOUS
Status: DISCONTINUED | OUTPATIENT
Start: 2018-12-17 | End: 2018-12-19 | Stop reason: HOSPADM

## 2018-12-17 RX ORDER — SODIUM CHLORIDE 0.9 % (FLUSH) 0.9 %
3 SYRINGE (ML) INJECTION EVERY 12 HOURS SCHEDULED
Status: DISCONTINUED | OUTPATIENT
Start: 2018-12-17 | End: 2018-12-19 | Stop reason: HOSPADM

## 2018-12-17 RX ORDER — DIPHENHYDRAMINE HCL 50 MG
50 CAPSULE ORAL 2 TIMES DAILY
Status: DISCONTINUED | OUTPATIENT
Start: 2018-12-17 | End: 2018-12-18 | Stop reason: ALTCHOICE

## 2018-12-17 RX ORDER — NITROGLYCERIN 0.4 MG/1
0.4 TABLET SUBLINGUAL
Status: DISCONTINUED | OUTPATIENT
Start: 2018-12-17 | End: 2018-12-19 | Stop reason: HOSPADM

## 2018-12-17 RX ORDER — OXYCODONE HYDROCHLORIDE AND ACETAMINOPHEN 5; 325 MG/1; MG/1
1 TABLET ORAL EVERY 6 HOURS PRN
Status: DISCONTINUED | OUTPATIENT
Start: 2018-12-17 | End: 2018-12-19 | Stop reason: HOSPADM

## 2018-12-17 RX ORDER — ALBUTEROL SULFATE 2.5 MG/3ML
2.5 SOLUTION RESPIRATORY (INHALATION) EVERY 6 HOURS PRN
Status: DISCONTINUED | OUTPATIENT
Start: 2018-12-17 | End: 2018-12-19 | Stop reason: HOSPADM

## 2018-12-17 RX ORDER — ASPIRIN 81 MG/1
81 TABLET ORAL DAILY
Status: DISCONTINUED | OUTPATIENT
Start: 2018-12-17 | End: 2018-12-19 | Stop reason: HOSPADM

## 2018-12-17 RX ORDER — RANOLAZINE 500 MG/1
500 TABLET, EXTENDED RELEASE ORAL EVERY 12 HOURS SCHEDULED
Status: DISCONTINUED | OUTPATIENT
Start: 2018-12-17 | End: 2018-12-18

## 2018-12-17 RX ORDER — ACETAMINOPHEN 325 MG/1
650 TABLET ORAL EVERY 6 HOURS PRN
Status: DISCONTINUED | OUTPATIENT
Start: 2018-12-17 | End: 2018-12-19 | Stop reason: HOSPADM

## 2018-12-17 RX ORDER — WARFARIN SODIUM 5 MG/1
5 TABLET ORAL
Status: DISCONTINUED | OUTPATIENT
Start: 2018-12-17 | End: 2018-12-17

## 2018-12-17 RX ORDER — ISOSORBIDE MONONITRATE 60 MG/1
120 TABLET, EXTENDED RELEASE ORAL DAILY
Status: DISCONTINUED | OUTPATIENT
Start: 2018-12-17 | End: 2018-12-19 | Stop reason: HOSPADM

## 2018-12-17 RX ORDER — FUROSEMIDE 10 MG/ML
20 INJECTION INTRAMUSCULAR; INTRAVENOUS ONCE
Status: COMPLETED | OUTPATIENT
Start: 2018-12-17 | End: 2018-12-17

## 2018-12-17 RX ORDER — NITROGLYCERIN 20 MG/100ML
5-200 INJECTION INTRAVENOUS
Status: DISCONTINUED | OUTPATIENT
Start: 2018-12-17 | End: 2018-12-18

## 2018-12-17 RX ADMIN — INSULIN LISPRO 4 UNITS: 100 INJECTION, SOLUTION INTRAVENOUS; SUBCUTANEOUS at 09:17

## 2018-12-17 RX ADMIN — ASPIRIN 81 MG: 81 TABLET, COATED ORAL at 09:32

## 2018-12-17 RX ADMIN — AMLODIPINE BESYLATE 10 MG: 10 TABLET ORAL at 09:17

## 2018-12-17 RX ADMIN — HYDROMORPHONE HYDROCHLORIDE 1 MG: 1 INJECTION, SOLUTION INTRAMUSCULAR; INTRAVENOUS; SUBCUTANEOUS at 05:31

## 2018-12-17 RX ADMIN — HYDROMORPHONE HYDROCHLORIDE 0.5 MG: 1 INJECTION, SOLUTION INTRAMUSCULAR; INTRAVENOUS; SUBCUTANEOUS at 09:01

## 2018-12-17 RX ADMIN — INSULIN LISPRO 2 UNITS: 100 INJECTION, SOLUTION INTRAVENOUS; SUBCUTANEOUS at 11:51

## 2018-12-17 RX ADMIN — ALUMINUM HYDROXIDE, MAGNESIUM HYDROXIDE, AND DIMETHICONE 15 ML: 400; 400; 40 SUSPENSION ORAL at 03:41

## 2018-12-17 RX ADMIN — RANOLAZINE 500 MG: 500 TABLET, FILM COATED, EXTENDED RELEASE ORAL at 20:14

## 2018-12-17 RX ADMIN — PANTOPRAZOLE SODIUM 40 MG: 40 TABLET, DELAYED RELEASE ORAL at 09:17

## 2018-12-17 RX ADMIN — LIDOCAINE HYDROCHLORIDE 15 ML: 20 SOLUTION ORAL; TOPICAL at 03:41

## 2018-12-17 RX ADMIN — NITROGLYCERIN 50 MCG/MIN: 20 INJECTION INTRAVENOUS at 22:35

## 2018-12-17 RX ADMIN — METOPROLOL TARTRATE 12.5 MG: 25 TABLET, FILM COATED ORAL at 20:13

## 2018-12-17 RX ADMIN — METOPROLOL TARTRATE 12.5 MG: 25 TABLET, FILM COATED ORAL at 11:51

## 2018-12-17 RX ADMIN — INSULIN LISPRO 3 UNITS: 100 INJECTION, SOLUTION INTRAVENOUS; SUBCUTANEOUS at 21:06

## 2018-12-17 RX ADMIN — SOTALOL HYDROCHLORIDE 80 MG: 80 TABLET ORAL at 09:17

## 2018-12-17 RX ADMIN — FUROSEMIDE 20 MG: 10 INJECTION, SOLUTION INTRAMUSCULAR; INTRAVENOUS at 17:23

## 2018-12-17 RX ADMIN — DIPHENHYDRAMINE HYDROCHLORIDE 50 MG: 50 CAPSULE ORAL at 20:14

## 2018-12-17 RX ADMIN — FENTANYL CITRATE 50 MCG: 50 INJECTION, SOLUTION INTRAMUSCULAR; INTRAVENOUS at 03:10

## 2018-12-17 RX ADMIN — ISOSORBIDE MONONITRATE 120 MG: 60 TABLET, EXTENDED RELEASE ORAL at 20:13

## 2018-12-17 RX ADMIN — PREDNISONE 50 MG: 20 TABLET ORAL at 17:23

## 2018-12-17 RX ADMIN — FENTANYL CITRATE 50 MCG: 50 INJECTION, SOLUTION INTRAMUSCULAR; INTRAVENOUS at 03:41

## 2018-12-17 RX ADMIN — FENTANYL CITRATE 50 MCG: 50 INJECTION, SOLUTION INTRAMUSCULAR; INTRAVENOUS at 04:43

## 2018-12-17 RX ADMIN — LEVOTHYROXINE SODIUM 50 MCG: 50 TABLET ORAL at 09:32

## 2018-12-17 RX ADMIN — CLOPIDOGREL BISULFATE 75 MG: 75 TABLET ORAL at 09:17

## 2018-12-17 RX ADMIN — ALBUTEROL SULFATE 2.5 MG: 2.5 SOLUTION RESPIRATORY (INHALATION) at 11:50

## 2018-12-17 RX ADMIN — SODIUM CHLORIDE, PRESERVATIVE FREE 3 ML: 5 INJECTION INTRAVENOUS at 20:15

## 2018-12-17 RX ADMIN — NITROGLYCERIN 10 MCG/MIN: 20 INJECTION INTRAVENOUS at 03:03

## 2018-12-17 NOTE — CONSULTS
[facility] at [facility]  Consultation History and Physical  Dennis Coon  1934  [unfilled]  [unfilled]    PCP:   Emerita Jorge MD  [unfilled]      Date of  Consultation: 12/17/2018 9:10 AM     Reason for Consultation: Chest Pain    .  CAD with recent NSTEMI 9/2018 medically managed  .  Echo 9/26/18      Left ventricular systolic function is normal.  · Estimated EF appears to be in the range of 56 - 60%.  · Left ventricular diastolic (grade I) consistent with impaired relaxation.  · Aortic valve area is 1.2 cm2.  · Mild aortic valve regurgitation is present.  · There is moderate calcification of the aortic valve  · Mild-moderate Aortic stenosis  · Aortic valve maximum pressure gradient is 25 mmHg. Aortic valve mean pressure gradient is 15.1 mmHg            Small patent foramen ovale present.  2.  Paroxysmal Atrial Fibrillation              A.  Diagnosed 2008 after CVA              B.  Abnormal Lexiscan Cardiolite June 2013:  Inferior Ischemia, EF 63%              C.  Echo 8/6/2014:  EF 70%, no valvular abnormalities, diastolic dysfunction noted.  3.  Diabetes Mellitus II  4.  Cerebrovascular Disease              A.  Lacunar CVA 2007              B.  PFO noted by MAXX, 2007.  Small atrial septal aneurysm with evidence of right-to-left shunting.         Patient    declined PFO closure at that time.              C.  TIA 2008.  Afib noted at that time.     5.  Hypothyroidism  6.  Seasonal Allergies/Rhinitis  7.  BPH  8.  H/O Nephrolithiais  9.  Surgical history              A.  Esophageal dilatation, 1997.               B.  Oral surgery.               C.  Vasectomy, 1960.               D.  Tonsils and adenoids.               E.  Right shoulder surgery.               F.  Squamous cell, occipital area, removal.               G.  Cataract surgery                      History of Present Illness:  Dennis Coon  Is a 83 y.o. male with pertinent cardiac history detailed above.    he presented to the emergency room earlier this morning with an episode of chest pain that started throughout the night.  He states he got up to go use the bathroom and then had sudden onset of sharp predominantly right-sided chest pain.  He took a sublingual nitroglycerin and had minimal relief.  He then came to the emergency room for further evaluation.  His EKGs in the ER had shown some anterior lateral ST depression which is more pronounced compared with his last EKG in clinic which was in November.  His troponins thus far are negative on 2 checks.  Since I saw him last in clinic he stopped Ranexa because he felt like this actually exacerbated his chest pain.  He states since he stopped that early November he had not had any episodes of chest pain and had not taken any sublingual nitroglycerin.  Of note the patient had a mechanical fall approximately 10 days ago does have some extensive right-sided chest bruising.  He states he's not had any pain this severe at the time or immediately after the fall.  He's currently on a nitroglycerin drip he's had some opioid medications but still appears in distress because of pain      Patient Active Problem List    Diagnosis Date Noted   • *Chest pain, atypical 09/26/2018   • Angina at rest (CMS/[facility]) 09/26/2018   • NSTEMI (non-ST elevated myocardial infarction) (CMS/[facility]) 09/26/2018   • CKD (chronic kidney disease) 09/26/2018   • Diabetes mellitus type 2 in obese (CMS/[facility]) 01/30/2018   • Osteoarthritis of right hip 01/30/2018   • Spondylosis of lumbar region without myelopathy or radiculopathy 01/30/2018   • Meralgia paresthetica of right side 01/29/2018   • Chronic anticoagulation 01/29/2018   • Paroxysmal atrial fibrillation (CMS/[facility]) 09/22/2016     Note Last Updated: 9/22/2016     a. Diagnosed in 2008, after CVA, April 2007.   b. Initiation of Coumadin and sotalol, 2008.   c. Abnormal Lexiscan Cardiolite stress test, June 2013, inferior ischemia. EF 63%.    d. Echocardiogram, 08/06/2014, EF 70%, normal left ventricular systolic function, diastolic dysfunction, no significant valvular abnormalities.         • Hypertension 09/22/2016   • Diabetes mellitus, type 2 (CMS/[facility]) 09/22/2016   • History of TIA (transient ischemic attack) 09/22/2016     Note Last Updated: 9/22/2016 2008     • History of CVA (cerebrovascular accident) 09/22/2016     Note Last Updated: 9/22/2016     Lacunar CVA, 2007,  e. PFO on MAXX, 2007: EF 50% to 60%, small atrial septal aneurysm and evidence of right-to-left shunt.  Patient declined PFO closure at the time.        • BPH (benign prostatic hypertrophy) 09/22/2016   • Hypothyroidism 09/22/2016   • On continuous oral anticoagulation 09/22/2016     Note Last Updated: 9/22/2016      Continue Coumadin.         Allergies   Allergen Reactions   • Actos [Pioglitazone]    • Aggrenox [Aspirin-Dipyridamole Er]    • Contrast Dye    • Diovan [Valsartan]    • Iodine    • Lisinopril    • Statins        Social History     Socioeconomic History   • Marital status:      Spouse name: Not on file   • Number of children: Not on file   • Years of education: Not on file   • Highest education level: Not on file   Social Needs   • Financial resource strain: Not on file   • Food insecurity - worry: Not on file   • Food insecurity - inability: Not on file   • Transportation needs - medical: Not on file   • Transportation needs - non-medical: Not on file   Occupational History   • Not on file   Tobacco Use   • Smoking status: Never Smoker   • Smokeless tobacco: Never Used   Substance and Sexual Activity   • Alcohol use: No   • Drug use: No   • Sexual activity: Defer   Other Topics Concern   • Not on file   Social History Narrative   • Not on file       Family History   Problem Relation Age of Onset   • No Known Problems Mother    • No Known Problems Father        Current Medications:    Current Facility-Administered Medications:   •  acetaminophen (TYLENOL)  tablet 650 mg, 650 mg, Oral, Q6H PRN, Marli Stevenson APRN  •  amLODIPine (NORVASC) tablet 10 mg, 10 mg, Oral, Daily, Marli Stevenson APRN  •  aspirin chewable tablet 324 mg, 324 mg, Oral, Once, Tusayan, Tutu, DO  •  clopidogrel (PLAVIX) tablet 75 mg, 75 mg, Oral, Daily, Marli Stevenson APRN  •  dextrose (D50W) 25 g/ 50mL Intravenous Solution 25 g, 25 g, Intravenous, Q15 Min PRN, Jermain Montano MD  •  dextrose (GLUTOSE) oral gel 15 g, 15 g, Oral, Q15 Min PRN, Jermain Montano MD  •  fentaNYL citrate (PF) (SUBLIMAZE) injection 50 mcg, 50 mcg, Intravenous, Q15 Min PRN, TusayanTutu velasquez, DO, 50 mcg at 12/17/18 0443  •  glucagon (human recombinant) (GLUCAGEN DIAGNOSTIC) injection 1 mg, 1 mg, Subcutaneous, PRN, Jermain Montano MD  •  HYDROmorphone (DILAUDID) injection 0.5 mg, 0.5 mg, Intravenous, Q3H PRN, Feli Britton II, DO, 0.5 mg at 12/17/18 0901  •  insulin lispro (humaLOG) injection 0-7 Units, 0-7 Units, Subcutaneous, 4x Daily With Meals & Nightly, Jermain Montano MD  •  isosorbide mononitrate (IMDUR) 24 hr tablet 120 mg, 120 mg, Oral, Daily, Marli Stevenson APRN  •  levothyroxine (SYNTHROID, LEVOTHROID) tablet 50 mcg, 50 mcg, Oral, Q AM, Marli Stevenson APRN  •  nitroglycerin (NITROSTAT) SL tablet 0.4 mg, 0.4 mg, Sublingual, Q5 Min PRN, Marli Stevenson APRN  •  nitroglycerin 50 mg/250 mL (0.2 mg/mL) infusion, 5-200 mcg/min, Intravenous, Titrated, Tusayan, Tutu, DO, Last Rate: 7.5 mL/hr at 12/17/18 0817, 25 mcg/min at 12/17/18 0817  •  oxyCODONE-acetaminophen (PERCOCET) 5-325 MG per tablet 1 tablet, 1 tablet, Oral, Q6H PRN, Feli Britton II, DO  •  pantoprazole (PROTONIX) EC tablet 40 mg, 40 mg, Oral, Daily, Marli Stevenson APRN  •  ranolazine (RANEXA) 12 hr tablet 500 mg, 500 mg, Oral, Q12H, Marli Stevenson APRN  •  sodium chloride 0.9 % flush 10 mL, 10 mL, Intravenous, PRN, Tutu Car,   •  sodium chloride 0.9 % flush 3 mL, 3 mL, Intravenous, Q12H, Marli Stevenson APRN  •  sodium chloride 0.9 % flush 3-10 mL,  "3-10 mL, Intravenous, PRN, Marli Stevenson APRN  •  sotalol (BETAPACE) tablet 80 mg, 80 mg, Oral, Q24H, Marli Stevenson APRN  •  warfarin (COUMADIN) tablet 5 mg, 5 mg, Oral, Daily, Marli Stevenson APRN     Review of Systems   Cardiovascular: Positive for chest pain. Negative for leg swelling, near-syncope, orthopnea and palpitations.   Respiratory: Negative for shortness of breath.    Hematologic/Lymphatic: Bruises/bleeds easily.   Gastrointestinal: Negative for abdominal pain, nausea and vomiting.   All other systems reviewed and are negative.      OBJECTIVE:  Vitals:    12/17/18 0610 12/17/18 0810 12/17/18 0812 12/17/18 0830   BP: 126/72 118/90 126/76 129/80   BP Location:  Left arm Right arm    Patient Position:  Lying Lying    Pulse: 74 83 83    Resp:  20     Temp:  97.6 °F (36.4 °C)     TempSrc:  Oral     SpO2: 90% 92% 90%    Weight:  90.5 kg (199 lb 8 oz)     Height:  180.3 cm (71\")       No intake/output data recorded.  No intake/output data recorded.  Intake & Output (last 3 days)     None             Physical Exam   Constitutional: He is oriented to person, place, and time. He appears well-developed and well-nourished. He appears distressed.   HENT:   Head: Normocephalic and atraumatic.   Eyes: EOM are normal.   Neck: Neck supple. No JVD present.   Cardiovascular: Normal rate and regular rhythm.   Murmur heard.   Systolic murmur is present with a grade of 3/6.  Consistent with AS   Pulmonary/Chest: Effort normal and breath sounds normal. No respiratory distress.   bruising right side of chest   Abdominal: Soft. Bowel sounds are normal. He exhibits no mass. There is no tenderness.   Musculoskeletal: Normal range of motion. He exhibits edema (trace LE).   Neurological: He is alert and oriented to person, place, and time.   Skin: Skin is warm. No rash noted.   Psychiatric: He has a normal mood and affect.   Nursing note and vitals reviewed.      Diagnostic Data:  Lab Results (last 24 hours)     Procedure " Component Value Units Date/Time    Troponin [[id number]] Collected:  12/17/18 0838    Specimen:  Blood Updated:  12/17/18 0847    POC Glucose Once [[id number]]  (Abnormal) Collected:  12/17/18 0826    Specimen:  Blood Updated:  12/17/18 0828     Glucose 252 mg/dL     POC Troponin, Rapid [[id number]]  (Normal) Collected:  12/17/18 0500    Specimen:  Blood Updated:  12/17/18 0520     Troponin I 0.00 ng/mL      Comment: Serial Number: [id number]:  [id number]       [place] Draw [[id number]] Collected:  12/17/18 0246    Specimen:  Blood Updated:  12/17/18 0511    Narrative:       The following orders were created for panel order [place] Draw.  Procedure                               Abnormality         Status                     ---------                               -----------         ------                     Light Blue Top[[id number]]                                   Final result               Green Top (Gel)[[id number]]                                  Final result               Lavender Top[[id number]]                                     Final result               Gold Top - SST[[id number]]                                   Final result               Green Top (No Gel)[[id number]]                                                            Please view results for these tests on the individual orders.    Light Blue Top [[id number]] Collected:  12/17/18 0246    Specimen:  Blood Updated:  12/17/18 0428     Extra Tube hold for add-on     Comment: Auto resulted       Green Top (Gel) [[id number]] Collected:  12/17/18 0246    Specimen:  Blood Updated:  12/17/18 0428     Extra Tube Hold for add-ons.     Comment: Auto resulted.       Lavender Top [[id number]] Collected:  12/17/18 0246    Specimen:  Blood Updated:  12/17/18 0428     Extra Tube hold for add-on     Comment: Auto resulted       Gold Top - SST [[id number]] Collected:  12/17/18 0246    Specimen:  Blood Updated:  12/17/18 0428     Extra Tube Hold for add-ons.     Comment:  Auto resulted.       Comprehensive Metabolic Panel [[id number]]  (Abnormal) Collected:  12/17/18 0246    Specimen:  Blood Updated:  12/17/18 0406     Glucose 248 mg/dL      BUN 34 mg/dL      Creatinine 1.66 mg/dL      Sodium 140 mmol/L      Potassium 4.8 mmol/L      Chloride 108 mmol/L      CO2 20.0 mmol/L      Calcium 9.7 mg/dL      Total Protein 6.8 g/dL      Albumin 4.40 g/dL      ALT (SGPT) 12 U/L      AST (SGOT) 12 U/L      Alkaline Phosphatase 100 U/L      Total Bilirubin 0.4 mg/dL      eGFR Non African Amer 40 mL/min/1.73      Globulin 2.4 gm/dL      A/G Ratio 1.8 g/dL      BUN/Creatinine Ratio 20.5     Anion Gap 12.0 mmol/L     Narrative:       [employer] Guidelines    Stage     Description        GFR  1         Normal or High     90+  2         Mild decrease      60-89  3         Moderate decrease  30-59  4         Severe decrease    15-29  5         Kidney failure     <15    The MDRD GFR formula is only valid for adults with stable renal function between ages 18 and 70.    Lipase [[id number]]  (Normal) Collected:  12/17/18 0246    Specimen:  Blood Updated:  12/17/18 0406     Lipase 25 U/L     BNP [[id number]]  (Normal) Collected:  12/17/18 0246    Specimen:  Blood Updated:  12/17/18 0348     BNP 80.0 pg/mL      Comment: Results may be falsely decreased if patient taking Biotin.       Protime-INR [[id number]]  (Abnormal) Collected:  12/17/18 0246    Specimen:  Blood Updated:  12/17/18 0338     Protime 26.8 Seconds      INR 2.55    POC Troponin, Rapid [[id number]]  (Normal) Collected:  12/17/18 0244    Specimen:  Blood Updated:  12/17/18 0310     Troponin I 0.00 ng/mL      Comment: Serial Number: [id number]:  [id number]       CBC & Differential [[id number]] Collected:  12/17/18 0246    Specimen:  Blood Updated:  12/17/18 0304    Narrative:       The following orders were created for panel order CBC & Differential.  Procedure                               Abnormality         Status                      ---------                               -----------         ------                     CBC Auto Differential[[id number]]        Abnormal            Final result                 Please view results for these tests on the individual orders.    CBC Auto Differential [[id number]]  (Abnormal) Collected:  12/17/18 0246    Specimen:  Blood Updated:  12/17/18 0304     WBC 7.42 10*3/mm3      RBC 3.56 10*6/mm3      Hemoglobin 10.9 g/dL      Hematocrit 33.3 %      MCV 93.5 fL      MCH 30.6 pg      MCHC 32.7 g/dL      RDW 13.5 %      RDW-SD 46.4 fl      MPV 9.5 fL      Platelets 311 10*3/mm3      Neutrophil % 75.7 %      Lymphocyte % 14.0 %      Monocyte % 7.5 %      Eosinophil % 2.3 %      Basophil % 0.5 %      Immature Grans % 0.8 %      Neutrophils, Absolute 5.61 10*3/mm3      Lymphocytes, Absolute 1.04 10*3/mm3      Monocytes, Absolute 0.56 10*3/mm3      Eosinophils, Absolute 0.17 10*3/mm3      Basophils, Absolute 0.04 10*3/mm3      Immature Grans, Absolute 0.06 10*3/mm3         ECG/EMG Results (last 24 hours)     Procedure Component Value Units Date/Time    ECG 12 Lead [[id number]] Collected:  12/17/18 0448     Updated:  12/17/18 0652    ECG 12 Lead [[id number]] Collected:  12/17/18 0234     Updated:  12/17/18 0659            Chest pain, atypical    Paroxysmal atrial fibrillation (CMS/[facility])    Hypertension    Diabetes mellitus, type 2 (CMS/[facility])    History of CVA (cerebrovascular accident)    Hypothyroidism    NSTEMI (non-ST elevated myocardial infarction) (CMS/[facility])    CKD (chronic kidney disease)      Assessment/Plan:        1. NSTEMI    -troponin now positive at 0.628, continue to trend  -recent NSTEMI 9/2018  EKGs do have ischemic appearing ST depressions in the anterior lateral leads which have progressed since his last outpatient EKG in November  -Continue Plavix, status post loading dose of aspirin. Continue ASA 81mg daily  -Hold warfarin ahead of cardiac catheterization INR is currently therapeutic at 2.55  -will need  pre-medication for contrast dye allergy  -continue medical management isosorbide, amlodipine, nitro gtt  -pain improved/resolved with increase ntg dose (50 mcg)  -start metoprolol 12.5mg BID  -didn't tolerate Ranexa as an outpatient  -Has listed statin allergy/ intolerance so as not currently on this therapy.  Had previosuly held on PCSK9 given goals of care  -echo EF 55-60%, mild-mod AS, normal wall motion on last admit  -has recent fall with bruising right chest, check rib x-ray       2.  Essential Hypertension   - continue isosorbide and amlodipine  -controlled     3. Paroxysmal Atrial Fibrillation   -  Maintaining NSR on Sotalol. continue sotalol  80 mg once daily  -hold warfarin       4.  Mild-moderate aortic stenosis  -Continue to monitor  -stable          Siddhartha Oneal MD

## 2018-12-17 NOTE — H&P
[facility] Medicine Services  HISTORY AND PHYSICAL    Patient Name: Dennis Coon  : 1934  MRN: [id number]  Primary Care Physician: Emerita Jorge MD  Date of admission: 2018      Subjective   Subjective     Chief Complaint: chest pain.    HPI:  Dennis Coon is a 84 y.o. male  with a hx of CAD, s/p NSTEMI that was medically treated, hypertension, HLD, Afib on coumadin, CVA, well controlled T2DM. Patient presents to [facility] with acute onset right sided chest pain denied any n/v, no diaphoresis. Patient is drowsy on my evaluation as such family provides most of the history. Per them patient fell down on hos right side 2 weeks ago and has significant bruising W/u in ED included troponin negative x2., EKG had no significant changes from previous. Patient was loaded on Asprin in EMS, here he was put on a nitro gtt and hospital medicine was asked to admit.    Review of Systems   Gen- No fevers, chills  CV- +chest pain, palpitations  Resp- No cough, dyspnea  GI- No N/V/D, abd pain    Otherwise 10-system ROS reviewed and is negative except as mentioned in the HPI.    Personal History     Past Medical History:   Diagnosis Date   • BPH (benign prostatic hypertrophy) 2016   • CVA (cerebral infarction) 2016   • Diabetes mellitus, type 2 (CMS/HCC) 2016   • Extremity pain    • Hypertension 2016   • Hypothyroidism 2016   • Nephrolithiasis    • On continuous oral anticoagulation 2016     Continue Coumadin.   • Paroxysmal atrial fibrillation (CMS/HCC) 2016   • TIA (transient ischemic attack) 2016       Past Surgical History:   Procedure Laterality Date   • CATARACT EXTRACTION     • ESOPHAGEAL DILATATION     • MOUTH SURGERY     • OTHER SURGICAL HISTORY      Squamous cell, occipital area, removal.   • SHOULDER SURGERY Right    • TONSILLECTOMY AND ADENOIDECTOMY     • VASECTOMY         Family History: family history includes No Known  Problems in his father and mother. Otherwise pertinent FHx was reviewed and unremarkable.     Social History:  reports that  has never smoked. he has never used smokeless tobacco. He reports that he does not drink alcohol or use drugs.  Social History     Social History Narrative   • Not on file       Medications:  Available home medication information reviewed.    Allergies   Allergen Reactions   • Actos [Pioglitazone]    • Aggrenox [Aspirin-Dipyridamole Er]    • Contrast Dye    • Diovan [Valsartan]    • Iodine    • Lisinopril    • Statins        Objective   Objective     Vital Signs:   Temp:  [97.7 °F (36.5 °C)] 97.7 °F (36.5 °C)  Heart Rate:  [72-74] 72  Resp:  [16] 16  BP: (124-146)/(69-79) 124/69      Physical Exam   Constitutional: No acute distress, drowsy  Eyes: PERRLA, sclerae anicteric, no conjunctival injection  HENT: NCAT, mucous membranes moist  Neck: Supple, no thyromegaly, no lymphadenopathy, trachea midline  Respiratory: Clear to auscultation bilaterally, nonlabored respirations   Cardiovascular: RRR, no murmurs, rubs, or gallops, palpable pedal pulses bilaterally  Gastrointestinal: Positive bowel sounds, soft, nontender, nondistended  Musculoskeletal: No bilateral ankle edema, no clubbing or cyanosis to extremities  Psychiatric: Appropriate affect, cooperative  Neurologic: Oriented x 3, strength symmetric in all extremities, Cranial Nerves grossly intact to confrontation, speech clear  Skin: No rashes    Results Reviewed:  I have personally reviewed current lab, radiology, and data and agree.    Results from last 7 days   Lab Units  12/17/18   0246   WBC 10*3/mm3  7.42   HEMOGLOBIN g/dL  10.9*   HEMATOCRIT %  33.3*   PLATELETS 10*3/mm3  311   INR   2.55*     Results from last 7 days   Lab Units  12/17/18   0246   SODIUM mmol/L  140   POTASSIUM mmol/L  4.8   CHLORIDE mmol/L  108   CO2 mmol/L  20.0   BUN mg/dL  34*   CREATININE mg/dL  1.66*   GLUCOSE mg/dL  248*   CALCIUM mg/dL  9.7   ALT (SGPT) U/L   12   AST (SGOT) U/L  12     Estimated Creatinine Clearance: 38 mL/min (A) (by C-G formula based on SCr of 1.66 mg/dL (H)).  Brief Urine Lab Results     None        BNP   Date Value Ref Range Status   12/17/2018 80.0 0.0 - 100.0 pg/mL Final     Comment:     Results may be falsely decreased if patient taking Biotin.     Imaging Results (last 24 hours)     Procedure Component Value Units Date/Time    XR Chest 1 View [[id number]] Collected:  12/17/18 0325     Updated:  12/17/18 0440    Narrative:       EXAM:    XR Chest, 1 View     EXAM DATE/TIME:    12/17/2018 3:25 AM     CLINICAL HISTORY:    84 years old, male; Pain; Chest pain; Type not specified; Additional info:   Chest pain triage protocol     TECHNIQUE:    XR of the chest, 1 view.     COMPARISON:    CR XR CHEST 1 VW 9/26/2018 2:55 AM     FINDINGS:    Lungs:  Mild linear bibasal opacities suggestive of subsegmental atelectasis.   Otherwise grossly clear.    Pleural space: Unremarkable. No pneumothorax.    Heart/Mediastinum: Unremarkable.     Bones/joints: Unremarkable.       Impression:       Mild linear bibasal opacities suggestive of subsegmental atelectasis.     THIS DOCUMENT HAS BEEN ELECTRONICALLY SIGNED BY EVELYN FREGOSO MD        Results for orders placed during the hospital encounter of 09/26/18   Adult Transthoracic Echo Complete W/ Cont if Necessary Per Protocol    Narrative · Left ventricular systolic function is normal.  · Estimated EF appears to be in the range of 56 - 60%.  · Left ventricular diastolic (grade I) consistent with impaired   relaxation.  · Aortic valve area is 1.2 cm2.  · Mild aortic valve regurgitation is present.  · There is moderate calcification of the aortic valve  · Mild-moderate Aortic stenosis  · Aortic valve maximum pressure gradient is 25 mmHg. Aortic valve mean   pressure gradient is 15.1 mmHg  · Small patent foramen ovale present.          Assessment/Plan   Assessment / Plan     Active Hospital Problems    Diagnosis   •  **Chest pain, atypical   • CKD (chronic kidney disease)   • NSTEMI (non-ST elevated myocardial infarction) (CMS/[facility])   • Paroxysmal atrial fibrillation (CMS/[facility])     a. Diagnosed in 2008, after CVA, April 2007.   b. Initiation of Coumadin and sotalol, 2008.   c. Abnormal Lexiscan Cardiolite stress test, June 2013, inferior ischemia. EF 63%.   d. Echocardiogram, 08/06/2014, EF 70%, normal left ventricular systolic function, diastolic dysfunction, no significant valvular abnormalities.         • Hypertension   • Diabetes mellitus, type 2 (CMS/[facility])   • History of CVA (cerebrovascular accident)     Lacunar CVA, 2007,  e. PFO on MAXX, 2007: EF 50% to 60%, small atrial septal aneurysm and evidence of right-to-left shunt.  Patient declined PFO closure at the time.        • Hypothyroidism       84 yoM with recent NSTEMI with no intervention, currently on medical management, recent fall, he  p/w acute onset right sided chest pain, suspect this more musculoskeletal but we will r/o cardiac.    Assessment & Plan:  - Chest pain, suspect this is musculoskeletal, it is right sided, non-reproducible, he is s/p fall. However considering his hx of NSTEMI with no intervention, cardiac etiology cannot be completely r/o,EKG is not changed from previous, troponin thus far negative x2, we will trend this, continue home cardiac rx  and consult cardiology to weigh in, he has previously seen by .  - CKD stage 3, Cr currently at baseline, continue to monitor  - Paroxysmal Afib currently on sotalol and coumadin, INR 2.55  - Well controlled T2DM A1C 7.2% not on any rx, place on ssi  - Continue home rx for chronic medical issues.    DVT prophylaxis:Coumadin    CODE STATUS:    Code Status and Medical Interventions:   Ordered at: 12/17/18 0606     Code Status:    No CPR     Medical Interventions (Level of Support Prior to Arrest):    Comfort Measures       Admission Status:  I believe this patient meets INPATIENT status due to the need  for care which can only be reasonably provided in an hospital setting such as aggressive/expedited ancillary services and/or consultation services, the necessity for IV medications, close physician monitoring and/or the possible need for procedures.  In such, I feel patient’s risk for adverse outcomes and need for care warrant INPATIENT evaluation and predict the patient’s care encounter to likely last beyond 2 midnights.    Electronically signed by Jermain Montano MD, 12/17/18, 5:49 AM.

## 2018-12-17 NOTE — PROGRESS NOTES
Discharge Planning Assessment  [facility]     Patient Name: Dennis Coon  MRN: [id number]  Today's Date: 12/17/2018    Admit Date: 12/17/2018    Discharge Needs Assessment     Row Name 12/17/18 1507       Living Environment    Lives With  spouse    Current Living Arrangements  home/apartment/condo    Primary Care Provided by  self    Provides Primary Care For  no one    Family Caregiver if Needed  spouse;child(fabian), adult    Quality of Family Relationships  helpful;involved    Able to Return to Prior Arrangements  yes    Living Arrangement Comments  PT and OT to see while here to determine safety going home        Resource/Environmental Concerns    Resource/Environmental Concerns  none    Transportation Concerns  car, none       Transition Planning    Patient/Family Anticipates Transition to  home;home with family    Patient/Family Anticipated Services at Transition  none    Transportation Anticipated  family or friend will provide       Discharge Needs Assessment    Readmission Within the Last 30 Days  no previous admission in last 30 days    Concerns to be Addressed  denies needs/concerns at this time    Equipment Currently Used at Home  walker, rolling;cane, straight    Anticipated Changes Related to Illness  none    Equipment Needed After Discharge  none        Discharge Plan     Row Name 12/17/18 5381       Plan    Plan  Initial CM eval     Patient/Family in Agreement with Plan  yes    Plan Comments  Patient lives at home with his spouse in [facility]. Established PCP is Emerita Jorge. Per patient he has a walker and cane at home already. He denies any needs or concerns regarding discharge at this time. PT and OT consult ordered to determine balance/gait/safety of returning home or if home health/ additional DME needed - CM amisha Rob [phone]         Destination      No service coordination in this encounter.      Durable Medical Equipment      No service coordination in this encounter.       Dialysis/Infusion      No service coordination in this encounter.      Home Medical Care      No service coordination in this encounter.      Community Resources      No service coordination in this encounter.          Demographic Summary     Row Name 12/17/18 1508       General Information    Arrived From  home    Referral Source  admission list    Reason for Consult  discharge planning    Preferred Language  English     Used During This Interaction  no       Contact Information    Permission Granted to Share Info With          Functional Status     Row Name 12/17/18 1508       Functional Status    Usual Activity Tolerance  moderate    Current Activity Tolerance  moderate       Functional Status, IADL    Medications  independent    Meal Preparation  assistive person    Housekeeping  assistive person    Laundry  assistive person    Shopping  assistive person       Mental Status Summary    Recent Changes in Mental Status/Cognitive Functioning  no changes        Psychosocial    No documentation.       Abuse/Neglect    No documentation.       Legal    No documentation.       Substance Abuse    No documentation.       Patient Forms    No documentation.           Liane Lagos RN

## 2018-12-17 NOTE — SIGNIFICANT NOTE
Repeat troponin 19.  ST depressions on earlier EKGs have improved.  Patient CP free on 50 mcg NTG gtt.  Repeat CXR showed pulmonary edema and he is requiring 4L O2. Given lasix 20mg IV  Will make NPO tonight, evaluate labs in am, if INR downtrending and no significant decline in renal function will tentative plan for coronary angiography.  Start premedication for contrast allergy with prednisone and benadryl tonight.  Given degree of troponin elevation and pulmonary edema, repeat echocardiogram

## 2018-12-17 NOTE — PROGRESS NOTES
Patient admitted early this am with chest pain. Sudden onset pain this am likely due to NSTEMI as his troponin now elevated and has EKG changes. D/W Dr. Oneal, for [facility] in 1-2 days once INR acceptable for cath. His right sided pain likely related to rib contusion from fall. No obvious fx noted. Increase pain meds, otherwise continue current plan of care.

## 2018-12-17 NOTE — PLAN OF CARE
Problem: Patient Care Overview  Goal: Plan of Care Review  Outcome: Ongoing (interventions implemented as appropriate)   12/17/18 0890   Coping/Psychosocial   Plan of Care Reviewed With patient;spouse   Plan of Care Review   Progress no change   OTHER   Outcome Summary VSS, SR on monitor. Patient had c/o right sided chest pain that improved with Dilaudid. Nitro gtt infusing at 50 mcg. Metoprolol added. Patient does not have SOA but wheezing and fine crackles noted in lungs - nebs and lasix ordered, IS given. Requiring 4L NC of O2 with sats 88-90% (new). NPO after midnight for [facility] tomorrow - pre-medicating with prednisone and benadryl. ECHO ordered. Has not c/o chest pain since Dilaudid and Nitro increase this AM. Troponin trended up to 19.428, Dr. Oneal notified. Little UOP prior to Lasix, bladder scan showed 201 in bladder. Denied pressure or discomfort. Will continue to monitor.

## 2018-12-17 NOTE — ED PROVIDER NOTES
Subjective   Dennis Coon is a 84 y.o. male with a hx of MI, DM, HTN, GERD, and CVA who presents to the ED with c/o chest pain with sudden onset 0000. The pain is located in the substernal region and radiates to his right chest. It is described as a pressure sensation and he states that it feels similar to a previous MI. He took sublingual NTG shortly after onset of his pain with mild relief of his sx and EMS was called to the scene who then gave him 324 mg ASA. He denies any recent leg swelling, blood in his stool, or melena. There are no other acute complaints at this time. Patient is currently on coumadin and Plavix. He was last hospitalized on 09/26/18 for recurrent substernal chest pain and was discharged on 09/28/18. Of note, the patient did not undergo a cardiac cath following his last MI.        History provided by:  Patient, relative and spouse  Chest Pain   Pain location:  R chest and substernal area  Pain quality: pressure    Onset quality:  Sudden  Duration:  3 hours (Onset 0000)  Timing:  Constant  Progression:  Improving  Chronicity:  New  Relieved by:  Aspirin and nitroglycerin  Worsened by:  Nothing  Associated symptoms: no fever    Risk factors: diabetes mellitus and hypertension        Review of Systems   Constitutional: Negative for chills and fever.   Cardiovascular: Positive for chest pain. Negative for leg swelling.   Gastrointestinal: Negative for blood in stool.   All other systems reviewed and are negative.      Past Medical History:   Diagnosis Date   • BPH (benign prostatic hypertrophy) 9/22/2016   • CVA (cerebral infarction) 9/22/2016   • Diabetes mellitus, type 2 (CMS/HCC) 9/22/2016   • Extremity pain    • Hypertension 9/22/2016   • Hypothyroidism 9/22/2016   • Nephrolithiasis    • On continuous oral anticoagulation 9/22/2016     Continue Coumadin.   • Paroxysmal atrial fibrillation (CMS/HCC) 9/22/2016   • TIA (transient ischemic attack) 9/22/2016       Allergies   Allergen  Reactions   • Actos [Pioglitazone]    • Aggrenox [Aspirin-Dipyridamole Er]    • Contrast Dye    • Diovan [Valsartan]    • Iodine    • Lisinopril    • Statins        Past Surgical History:   Procedure Laterality Date   • CATARACT EXTRACTION     • ESOPHAGEAL DILATATION  1997   • MOUTH SURGERY     • OTHER SURGICAL HISTORY      Squamous cell, occipital area, removal.   • SHOULDER SURGERY Right    • TONSILLECTOMY AND ADENOIDECTOMY     • VASECTOMY  1960       Family History   Problem Relation Age of Onset   • No Known Problems Mother    • No Known Problems Father        Social History     Socioeconomic History   • Marital status:      Spouse name: Not on file   • Number of children: Not on file   • Years of education: Not on file   • Highest education level: Not on file   Tobacco Use   • Smoking status: Never Smoker   • Smokeless tobacco: Never Used   Substance and Sexual Activity   • Alcohol use: No   • Drug use: No   • Sexual activity: Defer         Objective   Physical Exam   Constitutional: He is oriented to person, place, and time. He appears well-developed and well-nourished. No distress.   HENT:   Head: Normocephalic and atraumatic.   Nose: Nose normal.   Eyes: Conjunctivae are normal. No scleral icterus.   Neck: Normal range of motion. Neck supple.   Cardiovascular: Normal rate, regular rhythm, normal heart sounds and intact distal pulses.   No murmur heard.  Pulmonary/Chest: Effort normal and breath sounds normal. No respiratory distress. He exhibits no tenderness (Unable to reproduce pain with palpation of the anterior chest or epigastrium).   Abdominal: Soft. Bowel sounds are normal. There is no tenderness.   Musculoskeletal: Normal range of motion. He exhibits edema (2+ BLE).   Neurological: He is alert and oriented to person, place, and time.   No focal deficits   Skin: Skin is warm and dry. There is pallor (Mild).   Psychiatric: He has a normal mood and affect. His behavior is normal.   Nursing note  and vitals reviewed.      Critical Care  Performed by: Tutu Car DO  Authorized by: Tutu Car DO     Critical care provider statement:     Critical care time (minutes):  35    Critical care time was exclusive of:  Separately billable procedures and treating other patients    Critical care was necessary to treat or prevent imminent or life-threatening deterioration of the following conditions:  Cardiac failure    Critical care was time spent personally by me on the following activities:  Development of treatment plan with patient or surrogate, evaluation of patient's response to treatment, examination of patient, ordering and performing treatments and interventions, ordering and review of laboratory studies, ordering and review of radiographic studies, pulse oximetry, re-evaluation of patient's condition and review of old charts    I assumed direction of critical care for this patient from another provider in my specialty: no               ED Course       Recent Results (from the past 24 hour(s))   POC Troponin, Rapid    Collection Time: 12/17/18  2:44 AM   Result Value Ref Range    Troponin I 0.00 0.00 - 0.07 ng/mL   BNP    Collection Time: 12/17/18  2:46 AM   Result Value Ref Range    BNP 80.0 0.0 - 100.0 pg/mL   CBC Auto Differential    Collection Time: 12/17/18  2:46 AM   Result Value Ref Range    WBC 7.42 3.50 - 10.80 10*3/mm3    RBC 3.56 (L) 4.20 - 5.76 10*6/mm3    Hemoglobin 10.9 (L) 13.1 - 17.5 g/dL    Hematocrit 33.3 (L) 38.9 - 50.9 %    MCV 93.5 80.0 - 99.0 fL    MCH 30.6 27.0 - 31.0 pg    MCHC 32.7 32.0 - 36.0 g/dL    RDW 13.5 11.3 - 14.5 %    RDW-SD 46.4 37.0 - 54.0 fl    MPV 9.5 6.0 - 12.0 fL    Platelets 311 150 - 450 10*3/mm3    Neutrophil % 75.7 (H) 41.0 - 71.0 %    Lymphocyte % 14.0 (L) 24.0 - 44.0 %    Monocyte % 7.5 0.0 - 12.0 %    Eosinophil % 2.3 0.0 - 3.0 %    Basophil % 0.5 0.0 - 1.0 %    Immature Grans % 0.8 (H) 0.0 - 0.6 %    Neutrophils, Absolute 5.61 1.50 - 8.30 10*3/mm3     "Lymphocytes, Absolute 1.04 0.60 - 4.80 10*3/mm3    Monocytes, Absolute 0.56 0.00 - 1.00 10*3/mm3    Eosinophils, Absolute 0.17 0.00 - 0.30 10*3/mm3    Basophils, Absolute 0.04 0.00 - 0.20 10*3/mm3    Immature Grans, Absolute 0.06 (H) 0.00 - 0.03 10*3/mm3   Protime-INR    Collection Time: 12/17/18  2:46 AM   Result Value Ref Range    Protime 26.8 (H) 9.6 - 11.5 Seconds    INR 2.55 (H) 0.91 - 1.09     Note: In addition to lab results from this visit, the labs listed above may include labs taken at another facility or during a different encounter within the last 24 hours. Please correlate lab times with ED admission and discharge times for further clarification of the services performed during this visit.    XR Chest 1 View    (Results Pending)     Vitals:    12/17/18 0236   BP: 146/79   Pulse: 74   Resp: 16   Temp: 97.7 °F (36.5 °C)   TempSrc: Oral   SpO2: 95%   Weight: 93 kg (205 lb)   Height: 177.8 cm (70\")     Medications   sodium chloride 0.9 % flush 10 mL (not administered)   aspirin chewable tablet 324 mg (324 mg Oral Not Given 12/17/18 0240)   nitroglycerin 50 mg/250 mL (0.2 mg/mL) infusion (20 mcg/min Intravenous Rate/Dose Change 12/17/18 0336)   fentaNYL citrate (PF) (SUBLIMAZE) injection 50 mcg (50 mcg Intravenous Given 12/17/18 0341)   fentaNYL citrate (PF) (SUBLIMAZE) injection 50 mcg (50 mcg Intravenous Given 12/17/18 0310)   aluminum-magnesium hydroxide-simethicone (MAALOX MAX) 400-400-40 MG/5ML suspension 15 mL (15 mL Oral Given 12/17/18 0341)   lidocaine viscous (XYLOCAINE) 2 % mouth solution 15 mL (15 mL Mouth/Throat Given 12/17/18 0341)     ECG/EMG Results (last 24 hours)     Procedure Component Value Units Date/Time    ECG 12 Lead [[id number]] Collected:  12/17/18 0234     Updated:  12/17/18 0236        Although patient's troponins were both 0.00 here in the emergency department, his EKG is abnormal in his presentation is consistent with possible chest pain.  That being said he is oriented quite good " on Coumadin, received aspirin, and takes Plavix.  I discussed the case with Dr. Montano, hospitalist, who will admit the patient for further evaluation and management.  Patient's pain is relatively well controlled right now with opiate administration.  The nitroglycerin drip did not seem to make a significant difference in his pain.              MDM    Final diagnoses:   Chest pain, unspecified type   Abnormal EKG       Documentation assistance provided by crow Arias.  Information recorded by the scribe was done at my direction and has been verified and validated by me.     Alfa Arias  12/17/18 0313       Alfa Arias  12/17/18 0348       Tutu Car DO  12/17/18 1040

## 2018-12-18 ENCOUNTER — APPOINTMENT (OUTPATIENT)
Dept: GENERAL RADIOLOGY | Facility: HOSPITAL | Age: 83
End: 2018-12-18

## 2018-12-18 ENCOUNTER — APPOINTMENT (OUTPATIENT)
Dept: CARDIOLOGY | Facility: HOSPITAL | Age: 83
End: 2018-12-18
Attending: INTERNAL MEDICINE

## 2018-12-18 PROBLEM — I44.2 COMPLETE HEART BLOCK (HCC): Status: ACTIVE | Noted: 2018-12-18

## 2018-12-18 PROBLEM — S22.32XA CLOSED FRACTURE OF ONE RIB OF LEFT SIDE: Status: ACTIVE | Noted: 2018-12-18

## 2018-12-18 PROBLEM — I25.110 CORONARY ARTERY DISEASE INVOLVING NATIVE HEART WITH UNSTABLE ANGINA PECTORIS (HCC): Status: ACTIVE | Noted: 2018-12-18

## 2018-12-18 PROBLEM — I50.21 ACUTE SYSTOLIC (CONGESTIVE) HEART FAILURE (HCC): Status: ACTIVE | Noted: 2018-12-18

## 2018-12-18 PROBLEM — N17.9 ACUTE KIDNEY INJURY SUPERIMPOSED ON CHRONIC KIDNEY DISEASE (HCC): Status: ACTIVE | Noted: 2018-09-26

## 2018-12-18 LAB
ALBUMIN SERPL-MCNC: 4.03 G/DL (ref 3.2–4.8)
ALBUMIN/GLOB SERPL: 1.9 G/DL (ref 1.5–2.5)
ALP SERPL-CCNC: 93 U/L (ref 25–100)
ALT SERPL W P-5'-P-CCNC: 30 U/L (ref 7–40)
ANION GAP SERPL CALCULATED.3IONS-SCNC: 12 MMOL/L (ref 3–11)
ANION GAP SERPL CALCULATED.3IONS-SCNC: 13 MMOL/L (ref 3–11)
AST SERPL-CCNC: 70 U/L (ref 0–33)
BACTERIA UR QL AUTO: ABNORMAL /HPF
BH CV ECHO MEAS - AO MAX PG (FULL): 21.9 MMHG
BH CV ECHO MEAS - AO MAX PG: 23.4 MMHG
BH CV ECHO MEAS - AO MEAN PG (FULL): 12.4 MMHG
BH CV ECHO MEAS - AO MEAN PG: 13.2 MMHG
BH CV ECHO MEAS - AO ROOT AREA (BSA CORRECTED): 1.4
BH CV ECHO MEAS - AO ROOT AREA: 7.2 CM^2
BH CV ECHO MEAS - AO ROOT DIAM: 3 CM
BH CV ECHO MEAS - AO V2 MAX: 241.9 CM/SEC
BH CV ECHO MEAS - AO V2 MEAN: 176 CM/SEC
BH CV ECHO MEAS - AO V2 VTI: 51.6 CM
BH CV ECHO MEAS - AVA(I,A): 0.86 CM^2
BH CV ECHO MEAS - AVA(I,D): 0.86 CM^2
BH CV ECHO MEAS - AVA(V,A): 0.86 CM^2
BH CV ECHO MEAS - AVA(V,D): 0.86 CM^2
BH CV ECHO MEAS - BSA(HAYCOCK): 2.1 M^2
BH CV ECHO MEAS - BSA: 2.1 M^2
BH CV ECHO MEAS - BZI_BMI: 27.8 KILOGRAMS/M^2
BH CV ECHO MEAS - BZI_METRIC_HEIGHT: 180.3 CM
BH CV ECHO MEAS - BZI_METRIC_WEIGHT: 90.3 KG
BH CV ECHO MEAS - EDV(CUBED): 66.6 ML
BH CV ECHO MEAS - EDV(MOD-SP2): 136 ML
BH CV ECHO MEAS - EDV(MOD-SP4): 107 ML
BH CV ECHO MEAS - EDV(TEICH): 72.2 ML
BH CV ECHO MEAS - EF(CUBED): 46.4 %
BH CV ECHO MEAS - EF(MOD-BP): 38 %
BH CV ECHO MEAS - EF(MOD-SP2): 44.1 %
BH CV ECHO MEAS - EF(MOD-SP4): 31.8 %
BH CV ECHO MEAS - EF(TEICH): 39.2 %
BH CV ECHO MEAS - ESV(CUBED): 35.7 ML
BH CV ECHO MEAS - ESV(MOD-SP2): 76 ML
BH CV ECHO MEAS - ESV(MOD-SP4): 73 ML
BH CV ECHO MEAS - ESV(TEICH): 43.9 ML
BH CV ECHO MEAS - FS: 18.8 %
BH CV ECHO MEAS - IVS/LVPW: 1.3
BH CV ECHO MEAS - IVSD: 1.4 CM
BH CV ECHO MEAS - LA DIMENSION: 4 CM
BH CV ECHO MEAS - LA/AO: 1.3
BH CV ECHO MEAS - LAD MAJOR: 5.1 CM
BH CV ECHO MEAS - LAT PEAK E' VEL: 11.7 CM/SEC
BH CV ECHO MEAS - LV DIASTOLIC VOL/BSA (35-75): 50.8 ML/M^2
BH CV ECHO MEAS - LV MASS(C)D: 169 GRAMS
BH CV ECHO MEAS - LV MASS(C)DI: 80.3 GRAMS/M^2
BH CV ECHO MEAS - LV MAX PG: 1.5 MMHG
BH CV ECHO MEAS - LV MEAN PG: 0.8 MMHG
BH CV ECHO MEAS - LV SYSTOLIC VOL/BSA (12-30): 34.7 ML/M^2
BH CV ECHO MEAS - LV V1 MAX: 62.2 CM/SEC
BH CV ECHO MEAS - LV V1 MEAN: 40.9 CM/SEC
BH CV ECHO MEAS - LV V1 VTI: 13.4 CM
BH CV ECHO MEAS - LVIDD: 4.1 CM
BH CV ECHO MEAS - LVIDS: 3.3 CM
BH CV ECHO MEAS - LVLD AP2: 8.6 CM
BH CV ECHO MEAS - LVLD AP4: 8.2 CM
BH CV ECHO MEAS - LVLS AP2: 7.7 CM
BH CV ECHO MEAS - LVLS AP4: 7.3 CM
BH CV ECHO MEAS - LVOT AREA (M): 3.5 CM^2
BH CV ECHO MEAS - LVOT AREA: 3.3 CM^2
BH CV ECHO MEAS - LVOT DIAM: 2.1 CM
BH CV ECHO MEAS - LVPWD: 1.1 CM
BH CV ECHO MEAS - MED PEAK E' VEL: 2.7 CM/SEC
BH CV ECHO MEAS - PA ACC SLOPE: 1481 CM/SEC^2
BH CV ECHO MEAS - PA ACC TIME: 0.07 SEC
BH CV ECHO MEAS - PA PR(ACCEL): 46.9 MMHG
BH CV ECHO MEAS - RAP SYSTOLE: 8 MMHG
BH CV ECHO MEAS - RVDD: 2.4 CM
BH CV ECHO MEAS - RVSP: 30 MMHG
BH CV ECHO MEAS - SI(AO): 175.6 ML/M^2
BH CV ECHO MEAS - SI(CUBED): 14.7 ML/M^2
BH CV ECHO MEAS - SI(LVOT): 21.2 ML/M^2
BH CV ECHO MEAS - SI(MOD-SP2): 28.5 ML/M^2
BH CV ECHO MEAS - SI(MOD-SP4): 16.2 ML/M^2
BH CV ECHO MEAS - SI(TEICH): 13.5 ML/M^2
BH CV ECHO MEAS - SV(AO): 369.5 ML
BH CV ECHO MEAS - SV(CUBED): 30.9 ML
BH CV ECHO MEAS - SV(LVOT): 44.6 ML
BH CV ECHO MEAS - SV(MOD-SP2): 60 ML
BH CV ECHO MEAS - SV(MOD-SP4): 34 ML
BH CV ECHO MEAS - SV(TEICH): 28.3 ML
BH CV ECHO MEAS - TAPSE (>1.6): 2.5 CM2
BH CV ECHO MEAS - TR MAX PG: 22 MMHG
BH CV ECHO MEAS - TR MAX VEL: 232.4 CM/SEC
BH CV VAS BP LEFT ARM: NORMAL MMHG
BH CV XLRA - RV BASE: 4 CM
BH CV XLRA - RV LENGTH: 5.3 CM
BH CV XLRA - RV MID: 2.8 CM
BH CV XLRA - TDI S': 12.3 CM/SEC
BILIRUB SERPL-MCNC: 0.7 MG/DL (ref 0.3–1.2)
BILIRUB UR QL STRIP: NEGATIVE
BNP SERPL-MCNC: 956 PG/ML (ref 0–100)
BUN BLD-MCNC: 42 MG/DL (ref 9–23)
BUN BLD-MCNC: 49 MG/DL (ref 9–23)
BUN/CREAT SERPL: 21.9 (ref 7–25)
BUN/CREAT SERPL: 25.3 (ref 7–25)
CALCIUM SPEC-SCNC: 9.1 MG/DL (ref 8.7–10.4)
CALCIUM SPEC-SCNC: 9.4 MG/DL (ref 8.7–10.4)
CHLORIDE SERPL-SCNC: 105 MMOL/L (ref 99–109)
CHLORIDE SERPL-SCNC: 107 MMOL/L (ref 99–109)
CLARITY UR: CLEAR
CO2 SERPL-SCNC: 18 MMOL/L (ref 20–31)
CO2 SERPL-SCNC: 21 MMOL/L (ref 20–31)
COLOR UR: YELLOW
CREAT BLD-MCNC: 1.92 MG/DL (ref 0.6–1.3)
CREAT BLD-MCNC: 1.94 MG/DL (ref 0.6–1.3)
DEPRECATED RDW RBC AUTO: 46.1 FL (ref 37–54)
ERYTHROCYTE [DISTWIDTH] IN BLOOD BY AUTOMATED COUNT: 13.6 % (ref 11.3–14.5)
GFR SERPL CREATININE-BSD FRML MDRD: 33 ML/MIN/1.73
GFR SERPL CREATININE-BSD FRML MDRD: 34 ML/MIN/1.73
GLOBULIN UR ELPH-MCNC: 2.1 GM/DL
GLUCOSE BLD-MCNC: 213 MG/DL (ref 70–100)
GLUCOSE BLD-MCNC: 220 MG/DL (ref 70–100)
GLUCOSE BLDC GLUCOMTR-MCNC: 209 MG/DL (ref 70–130)
GLUCOSE BLDC GLUCOMTR-MCNC: 211 MG/DL (ref 70–130)
GLUCOSE BLDC GLUCOMTR-MCNC: 219 MG/DL (ref 70–130)
GLUCOSE UR STRIP-MCNC: NEGATIVE MG/DL
HCT VFR BLD AUTO: 31.5 % (ref 38.9–50.9)
HGB BLD-MCNC: 10.3 G/DL (ref 13.1–17.5)
HGB UR QL STRIP.AUTO: ABNORMAL
HYALINE CASTS UR QL AUTO: ABNORMAL /LPF
INR PPP: 2.42 (ref 0.91–1.09)
INR PPP: 2.8 (ref 0.91–1.09)
KETONES UR QL STRIP: NEGATIVE
LEFT ATRIUM VOLUME INDEX: 45.6 ML/M^2
LEFT ATRIUM VOLUME: 96 ML
LEUKOCYTE ESTERASE UR QL STRIP.AUTO: NEGATIVE
MAXIMAL PREDICTED HEART RATE: 136 BPM
MCH RBC QN AUTO: 30.4 PG (ref 27–31)
MCHC RBC AUTO-ENTMCNC: 32.7 G/DL (ref 32–36)
MCV RBC AUTO: 92.9 FL (ref 80–99)
NITRITE UR QL STRIP: NEGATIVE
PH UR STRIP.AUTO: <=5 [PH] (ref 5–8)
PLATELET # BLD AUTO: 316 10*3/MM3 (ref 150–450)
PMV BLD AUTO: 10 FL (ref 6–12)
POTASSIUM BLD-SCNC: 4.4 MMOL/L (ref 3.5–5.5)
POTASSIUM BLD-SCNC: 4.9 MMOL/L (ref 3.5–5.5)
PROT SERPL-MCNC: 6.1 G/DL (ref 5.7–8.2)
PROT UR QL STRIP: ABNORMAL
PROTHROMBIN TIME: 25.4 SECONDS (ref 9.6–11.5)
PROTHROMBIN TIME: 29.4 SECONDS (ref 9.6–11.5)
RBC # BLD AUTO: 3.39 10*6/MM3 (ref 4.2–5.76)
RBC # UR: ABNORMAL /HPF
REF LAB TEST METHOD: ABNORMAL
SODIUM BLD-SCNC: 138 MMOL/L (ref 132–146)
SODIUM BLD-SCNC: 138 MMOL/L (ref 132–146)
SP GR UR STRIP: 1.03 (ref 1–1.03)
SQUAMOUS #/AREA URNS HPF: ABNORMAL /HPF
STRESS TARGET HR: 116 BPM
TROPONIN I SERPL-MCNC: 33.63 NG/ML
UROBILINOGEN UR QL STRIP: ABNORMAL
WBC NRBC COR # BLD: 10.72 10*3/MM3 (ref 3.5–10.8)
WBC UR QL AUTO: ABNORMAL /HPF

## 2018-12-18 PROCEDURE — 99152 MOD SED SAME PHYS/QHP 5/>YRS: CPT | Performed by: INTERNAL MEDICINE

## 2018-12-18 PROCEDURE — 94799 UNLISTED PULMONARY SVC/PX: CPT

## 2018-12-18 PROCEDURE — 93306 TTE W/DOPPLER COMPLETE: CPT

## 2018-12-18 PROCEDURE — 99233 SBSQ HOSP IP/OBS HIGH 50: CPT | Performed by: INTERNAL MEDICINE

## 2018-12-18 PROCEDURE — 63710000001 PREDNISONE PER 5 MG: Performed by: INTERNAL MEDICINE

## 2018-12-18 PROCEDURE — 63710000001 INSULIN LISPRO (HUMAN) PER 5 UNITS: Performed by: INTERNAL MEDICINE

## 2018-12-18 PROCEDURE — C1894 INTRO/SHEATH, NON-LASER: HCPCS | Performed by: INTERNAL MEDICINE

## 2018-12-18 PROCEDURE — 4A023N7 MEASUREMENT OF CARDIAC SAMPLING AND PRESSURE, LEFT HEART, PERCUTANEOUS APPROACH: ICD-10-PCS | Performed by: INTERNAL MEDICINE

## 2018-12-18 PROCEDURE — 71045 X-RAY EXAM CHEST 1 VIEW: CPT

## 2018-12-18 PROCEDURE — 99024 POSTOP FOLLOW-UP VISIT: CPT | Performed by: INTERNAL MEDICINE

## 2018-12-18 PROCEDURE — 83880 ASSAY OF NATRIURETIC PEPTIDE: CPT | Performed by: INTERNAL MEDICINE

## 2018-12-18 PROCEDURE — 85610 PROTHROMBIN TIME: CPT | Performed by: INTERNAL MEDICINE

## 2018-12-18 PROCEDURE — 25010000002 FENTANYL CITRATE (PF) 100 MCG/2ML SOLUTION: Performed by: INTERNAL MEDICINE

## 2018-12-18 PROCEDURE — 25010000002 SULFUR HEXAFLUORIDE MICROSPH 60.7-25 MG RECONSTITUTED SUSPENSION: Performed by: INTERNAL MEDICINE

## 2018-12-18 PROCEDURE — 80053 COMPREHEN METABOLIC PANEL: CPT | Performed by: INTERNAL MEDICINE

## 2018-12-18 PROCEDURE — 25010000002 MIDAZOLAM PER 1 MG: Performed by: INTERNAL MEDICINE

## 2018-12-18 PROCEDURE — 25010000002 FUROSEMIDE PER 20 MG: Performed by: INTERNAL MEDICINE

## 2018-12-18 PROCEDURE — 93010 ELECTROCARDIOGRAM REPORT: CPT | Performed by: INTERNAL MEDICINE

## 2018-12-18 PROCEDURE — 63710000001 PREDNISONE PER 1 MG: Performed by: INTERNAL MEDICINE

## 2018-12-18 PROCEDURE — 81001 URINALYSIS AUTO W/SCOPE: CPT | Performed by: NURSE PRACTITIONER

## 2018-12-18 PROCEDURE — 25010000002 HYDROMORPHONE PER 4 MG: Performed by: INTERNAL MEDICINE

## 2018-12-18 PROCEDURE — 63710000001 DIPHENHYDRAMINE PER 50 MG: Performed by: INTERNAL MEDICINE

## 2018-12-18 PROCEDURE — 25010000002 AMIODARONE IN DEXTROSE 5% 150-4.21 MG/100ML-% SOLUTION: Performed by: INTERNAL MEDICINE

## 2018-12-18 PROCEDURE — 93306 TTE W/DOPPLER COMPLETE: CPT | Performed by: INTERNAL MEDICINE

## 2018-12-18 PROCEDURE — B2111ZZ FLUOROSCOPY OF MULTIPLE CORONARY ARTERIES USING LOW OSMOLAR CONTRAST: ICD-10-PCS | Performed by: INTERNAL MEDICINE

## 2018-12-18 PROCEDURE — 93458 L HRT ARTERY/VENTRICLE ANGIO: CPT | Performed by: INTERNAL MEDICINE

## 2018-12-18 PROCEDURE — 94760 N-INVAS EAR/PLS OXIMETRY 1: CPT

## 2018-12-18 PROCEDURE — 93005 ELECTROCARDIOGRAM TRACING: CPT | Performed by: INTERNAL MEDICINE

## 2018-12-18 PROCEDURE — 94660 CPAP INITIATION&MGMT: CPT

## 2018-12-18 PROCEDURE — 85027 COMPLETE CBC AUTOMATED: CPT | Performed by: INTERNAL MEDICINE

## 2018-12-18 PROCEDURE — 25010000002 AMIODARONE IN DEXTROSE 5% 360-4.14 MG/200ML-% SOLUTION: Performed by: INTERNAL MEDICINE

## 2018-12-18 PROCEDURE — 84484 ASSAY OF TROPONIN QUANT: CPT | Performed by: INTERNAL MEDICINE

## 2018-12-18 PROCEDURE — C1769 GUIDE WIRE: HCPCS | Performed by: INTERNAL MEDICINE

## 2018-12-18 PROCEDURE — 5A1213Z PERFORMANCE OF CARDIAC PACING, INTERMITTENT: ICD-10-PCS | Performed by: INTERNAL MEDICINE

## 2018-12-18 PROCEDURE — 0 IOPAMIDOL PER 1 ML: Performed by: INTERNAL MEDICINE

## 2018-12-18 PROCEDURE — 99153 MOD SED SAME PHYS/QHP EA: CPT | Performed by: INTERNAL MEDICINE

## 2018-12-18 PROCEDURE — 82962 GLUCOSE BLOOD TEST: CPT

## 2018-12-18 RX ORDER — LIDOCAINE HYDROCHLORIDE 10 MG/ML
INJECTION, SOLUTION EPIDURAL; INFILTRATION; INTRACAUDAL; PERINEURAL AS NEEDED
Status: DISCONTINUED | OUTPATIENT
Start: 2018-12-18 | End: 2018-12-18 | Stop reason: HOSPADM

## 2018-12-18 RX ORDER — FENTANYL CITRATE 50 UG/ML
INJECTION, SOLUTION INTRAMUSCULAR; INTRAVENOUS AS NEEDED
Status: DISCONTINUED | OUTPATIENT
Start: 2018-12-18 | End: 2018-12-18 | Stop reason: HOSPADM

## 2018-12-18 RX ORDER — FUROSEMIDE 10 MG/ML
60 INJECTION INTRAMUSCULAR; INTRAVENOUS ONCE
Status: COMPLETED | OUTPATIENT
Start: 2018-12-18 | End: 2018-12-18

## 2018-12-18 RX ORDER — MIDAZOLAM HYDROCHLORIDE 1 MG/ML
INJECTION INTRAMUSCULAR; INTRAVENOUS AS NEEDED
Status: DISCONTINUED | OUTPATIENT
Start: 2018-12-18 | End: 2018-12-18 | Stop reason: HOSPADM

## 2018-12-18 RX ORDER — NOREPINEPHRINE BITARTRATE 1 MG/ML
INJECTION, SOLUTION INTRAVENOUS CONTINUOUS PRN
Status: COMPLETED | OUTPATIENT
Start: 2018-12-18 | End: 2018-12-18

## 2018-12-18 RX ORDER — NOREPINEPHRINE BIT/0.9 % NACL 8 MG/250ML
.02-.3 INFUSION BOTTLE (ML) INTRAVENOUS
Status: DISCONTINUED | OUTPATIENT
Start: 2018-12-18 | End: 2018-12-19

## 2018-12-18 RX ADMIN — HYDROMORPHONE HYDROCHLORIDE 0.5 MG: 1 INJECTION, SOLUTION INTRAMUSCULAR; INTRAVENOUS; SUBCUTANEOUS at 06:18

## 2018-12-18 RX ADMIN — NITROGLYCERIN 120 MCG/MIN: 20 INJECTION INTRAVENOUS at 09:00

## 2018-12-18 RX ADMIN — FUROSEMIDE 60 MG: 10 INJECTION, SOLUTION INTRAMUSCULAR; INTRAVENOUS at 18:10

## 2018-12-18 RX ADMIN — LEVOTHYROXINE SODIUM 50 MCG: 50 TABLET ORAL at 05:37

## 2018-12-18 RX ADMIN — PANTOPRAZOLE SODIUM 40 MG: 40 TABLET, DELAYED RELEASE ORAL at 09:05

## 2018-12-18 RX ADMIN — HYDROMORPHONE HYDROCHLORIDE 0.5 MG: 1 INJECTION, SOLUTION INTRAMUSCULAR; INTRAVENOUS; SUBCUTANEOUS at 09:12

## 2018-12-18 RX ADMIN — DIPHENHYDRAMINE HYDROCHLORIDE 50 MG: 50 CAPSULE ORAL at 08:45

## 2018-12-18 RX ADMIN — NITROGLYCERIN 150 MCG/MIN: 20 INJECTION INTRAVENOUS at 10:31

## 2018-12-18 RX ADMIN — INSULIN LISPRO 3 UNITS: 100 INJECTION, SOLUTION INTRAVENOUS; SUBCUTANEOUS at 21:14

## 2018-12-18 RX ADMIN — ASPIRIN 81 MG: 81 TABLET, COATED ORAL at 09:05

## 2018-12-18 RX ADMIN — SOTALOL HYDROCHLORIDE 80 MG: 80 TABLET ORAL at 09:05

## 2018-12-18 RX ADMIN — SULFUR HEXAFLUORIDE 3 ML: KIT at 15:45

## 2018-12-18 RX ADMIN — PREDNISONE 50 MG: 20 TABLET ORAL at 08:44

## 2018-12-18 RX ADMIN — SODIUM CHLORIDE, PRESERVATIVE FREE 3 ML: 5 INJECTION INTRAVENOUS at 21:18

## 2018-12-18 RX ADMIN — INSULIN LISPRO 3 UNITS: 100 INJECTION, SOLUTION INTRAVENOUS; SUBCUTANEOUS at 09:05

## 2018-12-18 RX ADMIN — NITROGLYCERIN 150 MCG/MIN: 20 INJECTION INTRAVENOUS at 09:16

## 2018-12-18 RX ADMIN — CLOPIDOGREL BISULFATE 75 MG: 75 TABLET ORAL at 09:05

## 2018-12-18 RX ADMIN — AMLODIPINE BESYLATE 10 MG: 10 TABLET ORAL at 09:05

## 2018-12-18 RX ADMIN — AMIODARONE HYDROCHLORIDE 150 MG: 1.5 INJECTION, SOLUTION INTRAVENOUS at 18:10

## 2018-12-18 RX ADMIN — NITROGLYCERIN 100 MCG/MIN: 20 INJECTION INTRAVENOUS at 09:04

## 2018-12-18 RX ADMIN — AMIODARONE HYDROCHLORIDE 1 MG/MIN: 1.8 INJECTION, SOLUTION INTRAVENOUS at 18:20

## 2018-12-18 NOTE — PROGRESS NOTES
"[facility] Progress Note     LOS: 1 day   Patient Care Team:  Emerita Jorge MD as PCP - General  Emerita Jorge MD as PCP - Claims Attributed  Nestor Carrillo MD as Consulting Physician (Pain Medicine)  Rambo Mclean MD as Consulting Physician (Cardiology)  PCP:  Emerita Jorge MD    Chief Complaint: follow up NSTEMI    Subjective: Patient had return of chest pain around 6 AM this morning, nitroglycerin increased to 60 he also received some IV pain medicine and states it's now 1 or 2 out of 10.  Troponin elevated now to 33.  Unfortunately INR remains 2.4 and creatinine is increased 1.9 today.  He is still hemodynamically stable.  Discussed risks and benefits of cardiac catheterization, and patient has also agreed to temporarily rescind his DNR if he goes to cath lab.  EKGs overnight showed resolution for the most part of ischemic lateral ST depressions,  ST elevation in AVR      Review of Systems:   All systems have been reviewed and are negative with the exception of those mentioned above.      Objective:    Vital Sign Min/Max for last 24 hours  Temp  Min: 97.4 °F (36.3 °C)  Max: 97.7 °F (36.5 °C)   BP  Min: 85/57  Max: 137/85   Pulse  Min: 60  Max: 85   Resp  Min: 18  Max: 20   SpO2  Min: 88 %  Max: 94 %   No Data Recorded   No Data Recorded     Flowsheet Rows      First Filed Value   Admission Height  177.8 cm (70\") Documented at 12/17/2018 0236   Admission Weight  93 kg (205 lb) Documented at 12/17/2018 0236          Telemetry: Sinus rhythm      Intake/Output Summary (Last 24 hours) at 12/18/2018 0812  Last data filed at 12/18/2018 0300  Gross per 24 hour   Intake 240 ml   Output 1380 ml   Net -1140 ml     Intake & Output (last 3 days)       12/15 0701 - 12/16 0700 12/16 0701 - 12/17 0700 12/17 0701 - 12/18 0700 12/18 0701 - 12/19 0700    P.O.   240     Total Intake(mL/kg)   240 (2.7)     Urine (mL/kg/hr)   1380 (0.6)     Total Output   1380  "    Net   -1140                    Physical Exam:  Physical Exam   Constitutional: He is oriented to person, place, and time. He appears well-developed and well-nourished. No distress.   Neck: No JVD present.   Cardiovascular: Normal rate and intact distal pulses.   Murmur (2/6 systolic c/w known AS) heard.  Pulmonary/Chest: Effort normal. He has rales (bibasilar).   Abdominal: Soft. There is no tenderness.   Musculoskeletal: Normal range of motion. He exhibits no edema.   Extremities warm and well-perfused   Neurological: He is alert and oriented to person, place, and time.   Skin: Skin is warm and dry.        LABS/DIAGNOSTIC DATA:  Results from last 7 days   Lab Units  12/18/18   0618  12/17/18   0246   WBC 10*3/mm3  10.72  7.42   HEMOGLOBIN g/dL  10.3*  10.9*   HEMATOCRIT %  31.5*  33.3*   PLATELETS 10*3/mm3  316  311     No results found for: TROPONINT  Results from last 7 days   Lab Units  12/18/18   0618  12/17/18   0246   INR   2.42*  2.55*     Results from last 7 days   Lab Units  12/18/18   0618  12/17/18   0246   SODIUM mmol/L  138  140   POTASSIUM mmol/L  4.4  4.8   CHLORIDE mmol/L  105  108   CO2 mmol/L  21.0  20.0   BUN mg/dL  42*  34*   CREATININE mg/dL  1.92*  1.66*   CALCIUM mg/dL  9.4  9.7   BILIRUBIN mg/dL   --   0.4   ALK PHOS U/L   --   100   ALT (SGPT) U/L   --   12   AST (SGOT) U/L   --   12   GLUCOSE mg/dL  220*  248*                 Results from last 7 days   Lab Units  12/17/18   0246   BNP pg/mL  80.0       Medication Review:     amLODIPine 10 mg Oral Daily   aspirin 324 mg Oral Once   aspirin 81 mg Oral Daily   clopidogrel 75 mg Oral Daily   diphenhydrAMINE 50 mg Oral BID   insulin lispro 0-7 Units Subcutaneous 4x Daily With Meals & Nightly   isosorbide mononitrate 120 mg Oral Daily   levothyroxine 50 mcg Oral Q AM   metoprolol tartrate 12.5 mg Oral Q12H   pantoprazole 40 mg Oral Daily   predniSONE 50 mg Oral BID With Meals   ranolazine 500 mg Oral Q12H   sodium chloride 3 mL Intravenous  Q12H   sotalol 80 mg Oral Q24H        nitroglycerin 5-200 mcg/min Last Rate: 50 mcg/min (12/17/18 4045)          Chest pain, atypical    Paroxysmal atrial fibrillation (CMS/[facility])    Hypertension    Diabetes mellitus, type 2 (CMS/[facility])    History of CVA (cerebrovascular accident)    Hypothyroidism    NSTEMI (non-ST elevated myocardial infarction) (CMS/[facility])    CKD (chronic kidney disease)      Assessment/Plan:  1. NSTEMI     -troponin significantly elevated to 33  -recent NSTEMI 9/2018, at the time patient and wife opted for medical management given his age, CKD, and resolution of his chest pain  -EKGs have intermittent lateral ST depressions consistent with ischemia  -Continue Plavix, status post loading dose of aspirin. Continue ASA 81mg daily  -Hold warfarin ahead of cardiac catheterization INR is currently therapeutic at 2.4  -Receiving prednisone and Benadryl for contrast allergy  -continue medical management isosorbide, amlodipine, nitro gtt  -Had recurrence of pain this morning first time since yesterday and nitroglycerin drip increased to 100 mcgss  --hold metoprolol given pulmonary edema  -echo pending to reassess EF previously normal  -diuresed with lasix1.1 L, unfortunately creatine 1.9  -didn't tolerate Ranexa as an outpatient  -Has listed statin allergy/ intolerance so as not currently on this therapy.  Had previosuly held on PCSK9 given goals of care  -Patient at risk for bleeding complications and contrast-induced nephropathy with worsening kidney function, but with persistent chest pain and high degree of troponin elevation benefits outweigh risks, may have left main/prox LAD disease         2.  Essential Hypertension   - continue isosorbide and amlodipine  -controlled     3. Paroxysmal Atrial Fibrillation   -  Maintaining NSR on Sotalol. continue sotalol  80 mg once daily  -hold warfarin        4.  Mild-moderate aortic stenosis  -Continue to monitor  -stable    Plan for coronary angiography this morning  patient is agreeable            Siddhartha Oneal MD   12/18/18  8:12 AM

## 2018-12-18 NOTE — SIGNIFICANT NOTE
Cardiac catheterization today showed multivessel coronary artery disease with a chronic total occlusion of the right coronary artery and high-grade distal left main stenosis 70-80% involving the ostium of the LAD.  During the cardiac catheterization the patient also developed temporary complete heart block which required placement of a temporary transvenous pacer.  This may be due to underlying conduction disease, he had been receiving sotalol, and ischemia.  No intervention was undertaken.  The patient was placed on levophed for blood pressure support transferred to the ICU.  His heart block as since resolved and he now has intact conduction, however he does not have a left bundle branch block and has gone back into atrial fibrillation.  He denies chest pain but he is hypoxic requiring high flow nasal cannula.  He does not yet have a Rebolledo anchored and has had no urine output since he was transferred to ICU.  Blood pressure is stable on levophed.      Had a long talk with the patient and family about further options including medical management only of coronary artery disease, with involvement of palliative and likely hospice care.  Alternatively did discuss with my interventional partners possible approach at left main and ostial LAD PCI with possible Impala support.  Discussed this option with the patient and his family as well.  They have not made a final decision but my sense intalking with the patient that is that he will opt against return to catheter lab.  Also in the interim he is a DNR was reversed while he went to catheter lab.  He is okay with a current level of support which does include vasopressors and the temporary transvenous pacer but he does not want CPR or intubation.  I have updated his CODE STATUS to reflect this.      In the interim we will start an amiodarone bolus and drip for his atrial fibrillation and attempt to convert back to sinus, as this may help improve his cardiac output.  An echo  today showed his ejection fraction has declined to 30-35% due to his acute coronary syndrome and significant multivessel disease.  He has a listed IV contrast dye, and amiodarone may have overlap given its iodine activity will monitor closely for development of any skin reaction which was his reaction to IV contrast dye.  Also will give Lasix 60 mg IV for pulmonary edema and hypoxic respiratory failure and monitor urine output with Rebolledo.  Repeat labs including INR.

## 2018-12-18 NOTE — NURSING NOTE
Received phone call from Dr. Oneal regarding patients latest troponin.  He requested that an EKG be done and inquired into the patients symptoms.  RN confirmed that the patient denied having any chest pain, the nitro gtt was still infusing at 50 mcg., and the patient was resting comfortably.  RN reassured Dr. Oneal, that cardiology would be notified if there were overnight changes.

## 2018-12-18 NOTE — SIGNIFICANT NOTE
Troponin repeated, elevated at 32.  EKG repeated, ST depressions in precordial leads resolved.  Discussed with patient's RN, patient remains CP free, no distress, no new complaints.  ST changes earlier today did not meet criteria for posterior MI, no other ST elevation present.  Receiving pre-meds for contrast allergy.  NPO.  Asked RN to notify cardiology for any clinical change, new CP.  From symptom standpoint patient has been much improved since 930am this morning.

## 2018-12-18 NOTE — PROGRESS NOTES
INTENSIVIST NOTE     Hospital Day: 1      Mr. Dennis Coon, 84 y.o. male is followed for:   Chest pain  Management of acute and co-morbid conditions.        SUBJECTIVE     Dennis Coon is a 84 y.o. male brought to [facility] ED on 12/17 with complaints of right-sided chest pain unrelieved with SL nitroglycerin. EKG demonstrated apko-lateral ST depression more pronounced from previous readings in November. He did sustain fall ~10 days prior predominantly right-sided that is associated with extensive bruising. CXR did reveal nondisplaced left lateral 9th rib fracture, but nothing on the right. Also patchy airspace disease suggestive of edema or PNA and was administered Lasix. Initial troponins were negative. Cardiology was consulted with plan for LHC once INR acceptable (2.55 on admission). He was initiated on a Nitro gtt and admitted to the Hospitalist service.     Throughout the evening Mr. Coon's troponins continued to increase to a max 33.63. He was premedicated with prednisone and benadryl for contrast dye allergy and on 12/18 underwent LHC per Dr. Jewell revealing severe MV CAD involving the left main, LAD, ramus intermedius, and RCA. A temporary pacemaker was placed for complete heart block and he was initiated on norepinephrine for hypotension. He was transferred to the ICU for higher level of care.     Interval history:    Awake and alert and seen in ICU.  On partial nonrebreather facemask.  Respiratory status comfortable.  Rhythm currently atrial fibrillation.    The patient's relevant past medical, surgical and social history were reviewed and updated in Epic as appropriate.       OBJECTIVE     Vital Sign Min/Max for last 24 hours  Temp  Min: 97 °F (36.1 °C)  Max: 97.5 °F (36.4 °C)   BP  Min: 85/57  Max: 137/85   Pulse  Min: 60  Max: 88   Resp  Min: 16  Max: 20   SpO2  Min: 88 %  Max: 100 %   No Data Recorded   Weight  Min: 90.3 kg (199 lb)  Max: 90.3 kg (199 lb)      Intake/Output Summary (Last  "24 hours) at 12/18/2018 1648  Last data filed at 12/18/2018 0820  Gross per 24 hour   Intake 120 ml   Output 1330 ml   Net -1210 ml      Flowsheet Rows      First Filed Value   Admission Height  177.8 cm (70\") Documented at 12/17/2018 0236   Admission Weight  93 kg (205 lb) Documented at 12/17/2018 0236             12/17/18  0236 12/17/18  0810 12/18/18  1629   Weight: 93 kg (205 lb) 90.5 kg (199 lb 8 oz) 90.3 kg (199 lb)            Objective:  General Appearance:  In no acute distress.    Vital signs: (most recent): Blood pressure 119/86, pulse 78, temperature 97.2 °F (36.2 °C), temperature source Axillary, resp. rate 16, height 180.3 cm (71\"), weight 90.3 kg (199 lb), SpO2 94 %.    HEENT: Normal HEENT exam.  (Facemask in place)    Lungs:  Normal effort and normal respiratory rate.  He is not in respiratory distress.  There are rales and decreased breath sounds.  No wheezes or rhonchi.  (Basilar crackles)  Heart: Normal rate.  Irregular rhythm.  S1 normal and S2 normal.  No murmur, gallop or friction rub.   Chest: Symmetric chest wall expansion.   Abdomen: Abdomen is soft and non-distended.  Bowel sounds are normal.   There is no abdominal tenderness.   There is no mass. There is no splenomegaly. There is no hepatomegaly.   Extremities: There is no deformity or dependent edema.  (Right groin introducer in place with temporary wire)  Neurological: Patient is alert and oriented to person, place and time.    Pupils:  Pupils are equal, round, and reactive to light.    Skin:  Warm and dry.            I reviewed the patient's new clinical results.  I reviewed the patient's new imaging results/reports including actual images and agree with reports.      Chest X-Ray:  12/18/18    Impression: Asymmetric perihilar disease presumably asymmetric pulmonary  edema with little overall change from yesterday's study    INFUSIONS    norepinephrine 0.02-0.3 mcg/kg/min Last Rate: 0.02 mcg/kg/min (12/18/18 1311)       Results from last " 7 days   Lab Units  12/18/18   0618  12/17/18   0246   WBC 10*3/mm3  10.72  7.42   HEMOGLOBIN g/dL  10.3*  10.9*   HEMATOCRIT %  31.5*  33.3*   PLATELETS 10*3/mm3  316  311     Results from last 7 days   Lab Units  12/18/18   0618  12/17/18   0246   SODIUM mmol/L  138  140   POTASSIUM mmol/L  4.4  4.8   CHLORIDE mmol/L  105  108   CO2 mmol/L  21.0  20.0   BUN mg/dL  42*  34*   GLUCOSE mg/dL  220*  248*   CREATININE mg/dL  1.92*  1.66*   CALCIUM mg/dL  9.4  9.7                 [facility] Ventilation:      I reviewed the patient's medications.    Assessment/Plan   ASSESSMENT      Active Hospital Problems    Diagnosis   • **NSTEMI   • MV CAD with unstable angina pectoris     [facility] 12/18/18:   · There was severe multivessel coronary artery disease involving the left main, LAD, ramus intermedius, RCA  · There is a 70% discrete distal left main stenosis  · There is a 70% discrete, eccentric ostial LAD stenosis followed by 60% tubular proximal segment stenosis.  · There is a 70% discrete ostial stenosis in a a small ramus intermedius branch  · There is a 100% proximal RCA stenosis that has the appearance of a chronic total occlusion.  · Elevated left ventricular end-diastolic pressure of 30 mmHg  · Complete heart block during left ventricular pressure measurements     • Acute systolic (congestive) heart failure (CMS/HCC)   • Complete heart block (CMS/HCC)   • Acute kidney injury superimposed on chronic kidney disease (CMS/HCC)   • Closed fracture of the left 9th rib    • Chronic anticoagulation on warfarin    • Paroxysmal atrial fibrillation      a. Diagnosed in 2008, after CVA, April 2007.   b. Initiation of Coumadin and sotalol, 2008.   c. Abnormal Lexiscan Cardiolite stress test, June 2013, inferior ischemia. EF 63%.   d. Echocardiogram, 08/06/2014, EF 70%, normal left ventricular systolic function, diastolic dysfunction, no significant valvular abnormalities.         • Hypertension   • Diabetes mellitus, type 2    • History of  CVA (cerebrovascular accident)     Lacunar CVA, 2007,  e. PFO on MAXX, 2007: EF 50% to 60%, small atrial septal aneurysm and evidence of right-to-left shunt.  Patient declined PFO closure at the time.        • Hypothyroidism         84-year-old male presents with ACS and by cardiac catheterization as multivessel disease with diminished LV function.  Complete heart block during catheterization requiring temporary pacemaker.  Now appears to be in atrial fibrillation.  Acute kidney injury superimposed on chronic kidney disease.  Other comorbidities as noted above.          PLAN     1. ICU admission   2. Pacemaker per cardiology.    3. Wean levo fed   4. High flow nasal cannula   5. Monitor renal function and chest x-ray and give additional Lasix as needed   6. Aspirin/Plavix          I discussed the patient's findings and my recommendations with patient, family and nursing staff     Plan of care and goals reviewed with multidisciplinary team at daily rounds.    César Maier MD  Pulmonary and Critical Care Medicine  12/18/18 4:48 PM

## 2018-12-18 NOTE — PLAN OF CARE
Problem: Patient Care Overview  Goal: Plan of Care Review  Outcome: Ongoing (interventions implemented as appropriate)   12/18/18 0636   Coping/Psychosocial   Plan of Care Reviewed With patient   Plan of Care Review   Progress no change   OTHER   Outcome Summary Patient was pleasant and cooperative during the evening; Troponin continues to rise- see Dr. Oneal's note; Pt. using 3-4L of supplemental oxygen; Pt. rested well through the night; c/o of CP pain/tightness around 0615 this morning; rated the pain at 5-6 out of 10; Nitro gtt increased to 60mcg.-  blood pressures tolerating increase; and 0.5 of dilaudid given; Pt. reported that these interventions were effective and his pain subsided; Pt. tavarez been NPO since midnight in anticipation of [facility] today; continue to monitor.

## 2018-12-18 NOTE — PROGRESS NOTES
Continued Stay Note   Christine     Patient Name: Dennis Coon  MRN: [id number]  Today's Date: 12/18/2018    Admit Date: 12/17/2018    Discharge Plan     Row Name 12/18/18 1100       Plan    Plan  ongoing     Patient/Family in Agreement with Plan  yes    Plan Comments  Patient will be having heart cath today. Cm waiting to see how he does with PT/OT eval to be sure he is safe to go home with spouse. Will revisit after heart cath and PT/OT eval to be sure patient does not need DME or home health at discharge - jimmy [phone]         Discharge Codes    No documentation.             Jimmy Lagos RN

## 2018-12-18 NOTE — PROGRESS NOTES
Pt. Referred for Phase II Cardiac Rehab. Staff discussed benefits of exercise, program protocol, and educational material provided. Teach back verified.  Patient declines participation in Phase II program at this time due to other medical conditions and lack of interest in the program. Staff provided home exercise material to patient along with contact information if patient decides to participate in the future. Teach back verified.

## 2018-12-19 ENCOUNTER — APPOINTMENT (OUTPATIENT)
Dept: GENERAL RADIOLOGY | Facility: HOSPITAL | Age: 83
End: 2018-12-19

## 2018-12-19 ENCOUNTER — HOSPITAL ENCOUNTER (INPATIENT)
Facility: HOSPITAL | Age: 83
LOS: 8 days | Discharge: HOSPICE | End: 2018-12-27
Attending: INTERNAL MEDICINE | Admitting: INTERNAL MEDICINE

## 2018-12-19 VITALS
WEIGHT: 199 LBS | DIASTOLIC BLOOD PRESSURE: 65 MMHG | TEMPERATURE: 96.6 F | RESPIRATION RATE: 18 BRPM | HEIGHT: 71 IN | HEART RATE: 85 BPM | BODY MASS INDEX: 27.86 KG/M2 | OXYGEN SATURATION: 93 % | SYSTOLIC BLOOD PRESSURE: 105 MMHG

## 2018-12-19 DIAGNOSIS — Z74.09 IMPAIRED FUNCTIONAL MOBILITY, BALANCE, GAIT, AND ENDURANCE: Primary | ICD-10-CM

## 2018-12-19 PROBLEM — I45.9 HEART BLOCK: Status: ACTIVE | Noted: 2018-12-19

## 2018-12-19 LAB
ALBUMIN SERPL-MCNC: 4.04 G/DL (ref 3.2–4.8)
ALBUMIN/GLOB SERPL: 2.1 G/DL (ref 1.5–2.5)
ALP SERPL-CCNC: 88 U/L (ref 25–100)
ALT SERPL W P-5'-P-CCNC: 29 U/L (ref 7–40)
ANION GAP SERPL CALCULATED.3IONS-SCNC: 11 MMOL/L (ref 3–11)
AST SERPL-CCNC: 61 U/L (ref 0–33)
BASOPHILS # BLD AUTO: 0.01 10*3/MM3 (ref 0–0.2)
BASOPHILS NFR BLD AUTO: 0.1 % (ref 0–1)
BILIRUB SERPL-MCNC: 0.4 MG/DL (ref 0.3–1.2)
BUN BLD-MCNC: 56 MG/DL (ref 9–23)
BUN/CREAT SERPL: 25.6 (ref 7–25)
CALCIUM SPEC-SCNC: 9.3 MG/DL (ref 8.7–10.4)
CHLORIDE SERPL-SCNC: 105 MMOL/L (ref 99–109)
CO2 SERPL-SCNC: 23 MMOL/L (ref 20–31)
CREAT BLD-MCNC: 2.19 MG/DL (ref 0.6–1.3)
DEPRECATED RDW RBC AUTO: 46.2 FL (ref 37–54)
EOSINOPHIL # BLD AUTO: 0 10*3/MM3 (ref 0–0.3)
EOSINOPHIL NFR BLD AUTO: 0 % (ref 0–3)
ERYTHROCYTE [DISTWIDTH] IN BLOOD BY AUTOMATED COUNT: 13.8 % (ref 11.3–14.5)
GFR SERPL CREATININE-BSD FRML MDRD: 29 ML/MIN/1.73
GLOBULIN UR ELPH-MCNC: 2 GM/DL
GLUCOSE BLD-MCNC: 194 MG/DL (ref 70–100)
GLUCOSE BLDC GLUCOMTR-MCNC: 144 MG/DL (ref 70–130)
GLUCOSE BLDC GLUCOMTR-MCNC: 205 MG/DL (ref 70–130)
GLUCOSE BLDC GLUCOMTR-MCNC: 207 MG/DL (ref 70–130)
HCT VFR BLD AUTO: 34.6 % (ref 38.9–50.9)
HGB BLD-MCNC: 11.4 G/DL (ref 13.1–17.5)
IMM GRANULOCYTES # BLD: 0.06 10*3/MM3 (ref 0–0.03)
IMM GRANULOCYTES NFR BLD: 0.3 % (ref 0–0.6)
LYMPHOCYTES # BLD AUTO: 0.82 10*3/MM3 (ref 0.6–4.8)
LYMPHOCYTES NFR BLD AUTO: 4.7 % (ref 24–44)
MAGNESIUM SERPL-MCNC: 2 MG/DL (ref 1.3–2.7)
MCH RBC QN AUTO: 30.2 PG (ref 27–31)
MCHC RBC AUTO-ENTMCNC: 32.9 G/DL (ref 32–36)
MCV RBC AUTO: 91.8 FL (ref 80–99)
MONOCYTES # BLD AUTO: 1.96 10*3/MM3 (ref 0–1)
MONOCYTES NFR BLD AUTO: 11.3 % (ref 0–12)
NEUTROPHILS # BLD AUTO: 14.57 10*3/MM3 (ref 1.5–8.3)
NEUTROPHILS NFR BLD AUTO: 83.6 % (ref 41–71)
PHOSPHATE SERPL-MCNC: 4.2 MG/DL (ref 2.4–5.1)
PLATELET # BLD AUTO: 330 10*3/MM3 (ref 150–450)
PMV BLD AUTO: 9.8 FL (ref 6–12)
POTASSIUM BLD-SCNC: 4.2 MMOL/L (ref 3.5–5.5)
PROT SERPL-MCNC: 6 G/DL (ref 5.7–8.2)
RBC # BLD AUTO: 3.77 10*6/MM3 (ref 4.2–5.76)
SODIUM BLD-SCNC: 139 MMOL/L (ref 132–146)
WBC NRBC COR # BLD: 17.42 10*3/MM3 (ref 3.5–10.8)

## 2018-12-19 PROCEDURE — 25010000002 AMIODARONE IN DEXTROSE 5% 360-4.14 MG/200ML-% SOLUTION: Performed by: INTERNAL MEDICINE

## 2018-12-19 PROCEDURE — 71045 X-RAY EXAM CHEST 1 VIEW: CPT

## 2018-12-19 PROCEDURE — 80053 COMPREHEN METABOLIC PANEL: CPT | Performed by: INTERNAL MEDICINE

## 2018-12-19 PROCEDURE — 94760 N-INVAS EAR/PLS OXIMETRY 1: CPT

## 2018-12-19 PROCEDURE — 99232 SBSQ HOSP IP/OBS MODERATE 35: CPT | Performed by: INTERNAL MEDICINE

## 2018-12-19 PROCEDURE — 25010000002 FUROSEMIDE PER 20 MG: Performed by: INTERNAL MEDICINE

## 2018-12-19 PROCEDURE — 84100 ASSAY OF PHOSPHORUS: CPT | Performed by: INTERNAL MEDICINE

## 2018-12-19 PROCEDURE — 99238 HOSP IP/OBS DSCHRG MGMT 30/<: CPT | Performed by: INTERNAL MEDICINE

## 2018-12-19 PROCEDURE — 82962 GLUCOSE BLOOD TEST: CPT

## 2018-12-19 PROCEDURE — 85025 COMPLETE CBC W/AUTO DIFF WBC: CPT | Performed by: INTERNAL MEDICINE

## 2018-12-19 PROCEDURE — 83735 ASSAY OF MAGNESIUM: CPT | Performed by: INTERNAL MEDICINE

## 2018-12-19 RX ORDER — HYDROMORPHONE HYDROCHLORIDE 1 MG/ML
0.5 INJECTION, SOLUTION INTRAMUSCULAR; INTRAVENOUS; SUBCUTANEOUS
Status: DISCONTINUED | OUTPATIENT
Start: 2018-12-19 | End: 2018-12-19

## 2018-12-19 RX ORDER — SODIUM CHLORIDE 0.9 % (FLUSH) 0.9 %
3-10 SYRINGE (ML) INJECTION AS NEEDED
Status: CANCELLED | OUTPATIENT
Start: 2018-12-19

## 2018-12-19 RX ORDER — FENTANYL CITRATE 50 UG/ML
50 INJECTION, SOLUTION INTRAMUSCULAR; INTRAVENOUS
Status: CANCELLED | OUTPATIENT
Start: 2018-12-19

## 2018-12-19 RX ORDER — HYDROMORPHONE HYDROCHLORIDE 1 MG/ML
0.5 INJECTION, SOLUTION INTRAMUSCULAR; INTRAVENOUS; SUBCUTANEOUS
Status: DISCONTINUED | OUTPATIENT
Start: 2018-12-19 | End: 2018-12-27

## 2018-12-19 RX ORDER — AMIODARONE HYDROCHLORIDE 200 MG/1
200 TABLET ORAL
Status: DISCONTINUED | OUTPATIENT
Start: 2018-12-19 | End: 2018-12-19 | Stop reason: HOSPADM

## 2018-12-19 RX ORDER — ISOSORBIDE MONONITRATE 60 MG/1
120 TABLET, EXTENDED RELEASE ORAL DAILY
Status: CANCELLED | OUTPATIENT
Start: 2018-12-19

## 2018-12-19 RX ORDER — HALOPERIDOL 5 MG/ML
1 INJECTION INTRAMUSCULAR EVERY 4 HOURS PRN
Status: DISCONTINUED | OUTPATIENT
Start: 2018-12-19 | End: 2018-12-27

## 2018-12-19 RX ORDER — SODIUM CHLORIDE 0.9 % (FLUSH) 0.9 %
3-10 SYRINGE (ML) INJECTION AS NEEDED
Status: DISCONTINUED | OUTPATIENT
Start: 2018-12-19 | End: 2018-12-27

## 2018-12-19 RX ORDER — NITROGLYCERIN 0.4 MG/1
0.4 TABLET SUBLINGUAL
Status: DISCONTINUED | OUTPATIENT
Start: 2018-12-19 | End: 2018-12-27 | Stop reason: HOSPADM

## 2018-12-19 RX ORDER — ALBUTEROL SULFATE 2.5 MG/3ML
2.5 SOLUTION RESPIRATORY (INHALATION) EVERY 6 HOURS PRN
Status: CANCELLED | OUTPATIENT
Start: 2018-12-19

## 2018-12-19 RX ORDER — AMIODARONE HYDROCHLORIDE 200 MG/1
200 TABLET ORAL
Status: DISCONTINUED | OUTPATIENT
Start: 2018-12-19 | End: 2018-12-27 | Stop reason: HOSPADM

## 2018-12-19 RX ORDER — ACETAMINOPHEN 650 MG/1
650 SUPPOSITORY RECTAL EVERY 4 HOURS PRN
Status: DISCONTINUED | OUTPATIENT
Start: 2018-12-19 | End: 2018-12-27

## 2018-12-19 RX ORDER — ACETAMINOPHEN 325 MG/1
650 TABLET ORAL EVERY 6 HOURS PRN
Status: DISCONTINUED | OUTPATIENT
Start: 2018-12-19 | End: 2018-12-27 | Stop reason: HOSPADM

## 2018-12-19 RX ORDER — OXYCODONE HYDROCHLORIDE AND ACETAMINOPHEN 5; 325 MG/1; MG/1
1 TABLET ORAL EVERY 6 HOURS PRN
Status: CANCELLED | OUTPATIENT
Start: 2018-12-19 | End: 2018-12-27

## 2018-12-19 RX ORDER — DEXTROSE MONOHYDRATE 25 G/50ML
25 INJECTION, SOLUTION INTRAVENOUS
Status: CANCELLED | OUTPATIENT
Start: 2018-12-19

## 2018-12-19 RX ORDER — GLYCOPYRROLATE 0.2 MG/ML
0.4 INJECTION INTRAMUSCULAR; INTRAVENOUS EVERY 6 HOURS PRN
Status: DISCONTINUED | OUTPATIENT
Start: 2018-12-19 | End: 2018-12-27

## 2018-12-19 RX ORDER — CLOPIDOGREL BISULFATE 75 MG/1
75 TABLET ORAL DAILY
Status: CANCELLED | OUTPATIENT
Start: 2018-12-20

## 2018-12-19 RX ORDER — PANTOPRAZOLE SODIUM 40 MG/1
40 TABLET, DELAYED RELEASE ORAL DAILY
Status: DISCONTINUED | OUTPATIENT
Start: 2018-12-19 | End: 2018-12-27 | Stop reason: HOSPADM

## 2018-12-19 RX ORDER — SODIUM CHLORIDE 0.9 % (FLUSH) 0.9 %
3 SYRINGE (ML) INJECTION EVERY 12 HOURS SCHEDULED
Status: DISCONTINUED | OUTPATIENT
Start: 2018-12-19 | End: 2018-12-27

## 2018-12-19 RX ORDER — LEVOTHYROXINE SODIUM 0.05 MG/1
50 TABLET ORAL
Status: DISCONTINUED | OUTPATIENT
Start: 2018-12-20 | End: 2018-12-27 | Stop reason: HOSPADM

## 2018-12-19 RX ORDER — PANTOPRAZOLE SODIUM 40 MG/1
40 TABLET, DELAYED RELEASE ORAL DAILY
Status: CANCELLED | OUTPATIENT
Start: 2018-12-20

## 2018-12-19 RX ORDER — ACETAMINOPHEN 325 MG/1
650 TABLET ORAL EVERY 6 HOURS PRN
Status: CANCELLED | OUTPATIENT
Start: 2018-12-19

## 2018-12-19 RX ORDER — HYDROMORPHONE HYDROCHLORIDE 1 MG/ML
0.5 INJECTION, SOLUTION INTRAMUSCULAR; INTRAVENOUS; SUBCUTANEOUS
Status: CANCELLED | OUTPATIENT
Start: 2018-12-19 | End: 2018-12-27

## 2018-12-19 RX ORDER — LORAZEPAM 2 MG/ML
0.5 INJECTION INTRAMUSCULAR
Status: DISCONTINUED | OUTPATIENT
Start: 2018-12-19 | End: 2018-12-27

## 2018-12-19 RX ORDER — AMIODARONE HYDROCHLORIDE 200 MG/1
200 TABLET ORAL
Status: CANCELLED | OUTPATIENT
Start: 2018-12-19

## 2018-12-19 RX ORDER — BISACODYL 10 MG
10 SUPPOSITORY, RECTAL RECTAL DAILY PRN
Status: DISCONTINUED | OUTPATIENT
Start: 2018-12-19 | End: 2018-12-27

## 2018-12-19 RX ORDER — ASPIRIN 81 MG/1
81 TABLET ORAL DAILY
Status: DISCONTINUED | OUTPATIENT
Start: 2018-12-20 | End: 2018-12-27 | Stop reason: HOSPADM

## 2018-12-19 RX ORDER — ASPIRIN 81 MG/1
81 TABLET ORAL DAILY
Status: CANCELLED | OUTPATIENT
Start: 2018-12-20

## 2018-12-19 RX ORDER — FUROSEMIDE 10 MG/ML
60 INJECTION INTRAMUSCULAR; INTRAVENOUS ONCE
Status: COMPLETED | OUTPATIENT
Start: 2018-12-19 | End: 2018-12-19

## 2018-12-19 RX ORDER — IPRATROPIUM BROMIDE AND ALBUTEROL SULFATE 2.5; .5 MG/3ML; MG/3ML
3 SOLUTION RESPIRATORY (INHALATION) EVERY 4 HOURS PRN
Status: DISCONTINUED | OUTPATIENT
Start: 2018-12-19 | End: 2018-12-27

## 2018-12-19 RX ORDER — SODIUM CHLORIDE 0.9 % (FLUSH) 0.9 %
3 SYRINGE (ML) INJECTION EVERY 12 HOURS SCHEDULED
Status: CANCELLED | OUTPATIENT
Start: 2018-12-19

## 2018-12-19 RX ORDER — CLOPIDOGREL BISULFATE 75 MG/1
75 TABLET ORAL DAILY
Status: DISCONTINUED | OUTPATIENT
Start: 2018-12-20 | End: 2018-12-27 | Stop reason: HOSPADM

## 2018-12-19 RX ORDER — LEVOTHYROXINE SODIUM 0.05 MG/1
50 TABLET ORAL
Status: CANCELLED | OUTPATIENT
Start: 2018-12-20

## 2018-12-19 RX ORDER — NITROGLYCERIN 0.4 MG/1
0.4 TABLET SUBLINGUAL
Status: CANCELLED | OUTPATIENT
Start: 2018-12-19

## 2018-12-19 RX ADMIN — AMIODARONE HYDROCHLORIDE 0.5 MG/MIN: 1.8 INJECTION, SOLUTION INTRAVENOUS at 00:18

## 2018-12-19 RX ADMIN — CLOPIDOGREL BISULFATE 75 MG: 75 TABLET ORAL at 08:39

## 2018-12-19 RX ADMIN — ASPIRIN 81 MG: 81 TABLET, COATED ORAL at 08:39

## 2018-12-19 RX ADMIN — PANTOPRAZOLE SODIUM 40 MG: 40 TABLET, DELAYED RELEASE ORAL at 08:39

## 2018-12-19 RX ADMIN — FUROSEMIDE 60 MG: 10 INJECTION, SOLUTION INTRAMUSCULAR; INTRAVENOUS at 10:36

## 2018-12-19 RX ADMIN — AMIODARONE HYDROCHLORIDE 200 MG: 200 TABLET ORAL at 17:32

## 2018-12-19 RX ADMIN — INSULIN LISPRO 3 UNITS: 100 INJECTION, SOLUTION INTRAVENOUS; SUBCUTANEOUS at 08:39

## 2018-12-19 RX ADMIN — LEVOTHYROXINE SODIUM 50 MCG: 50 TABLET ORAL at 06:59

## 2018-12-19 NOTE — DISCHARGE PLACEMENT REQUEST
"Hira Coon (84 y.o. Male)     Date of Birth Social Security Number Address Home Phone MRN    1934  [address] [place] [id number] [phone] [id number]    Denominational Marital Status          P[employer]alism        Admission Date Admission Type Admitting Provider Attending Provider Department, Room/Bed    12/17/18 Emergency César Maier MD Tzouanakis, Alexander E, MD [address] ICU, N213/1    Discharge Date Discharge Disposition Discharge Destination                       Attending Provider:  César Maier MD    Allergies:  Actos [Pioglitazone], Aggrenox [Aspirin-dipyridamole Er], Contrast Dye, Diovan [Valsartan], Iodine, Lisinopril, Statins    Isolation:  None   Infection:  None   Code Status:  No CPR    Ht:  180.3 cm (71\")   Wt:  90.3 kg (199 lb)    Admission Cmt:  None   Principal Problem:  NSTEMI [I21.4]                 Active Insurance as of 12/17/2018     Primary Coverage     Payor Plan Insurance Group Employer/Plan Group    MEDICARE MEDICARE A & B      Payor Plan Address Payor Plan Phone Number Payor Plan Fax Number Effective Dates    [address] [phone]  11/1/1999 - None Entered    [facility] [id number]       Subscriber Name Subscriber Birth Date Member ID       HIRA COON 1934 [id number]           Secondary Coverage     Payor Plan Insurance Group Employer/Plan Group    [facility] SUPP [id number]     Payor Plan Address Payor Plan Phone Number Payor Plan Fax Number Effective Dates    [address]   12/1/2016 - None Entered    [facility] [address]       Subscriber Name Subscriber Birth Date Member ID       HIRA COON 1934 [id number]                 Emergency Contacts      (Rel.) Home Phone Work Phone Mobile Phone    JoseMonique dolan (Spouse) [phone] -- [phone]            Emergency Contact Information     Name Relation Home Work Mobile    JoseMonique dolan Spouse " [phone]  [phone]          Insurance Information                MEDICARE/MEDICARE A & B Phone: [phone]    Subscriber: Dennis Coon Subscriber#: [id number]    Group#:  Precert#:         JONY BLUE CROSS/JONY BC  SUPP Phone:     Subscriber: Dennis Coon Subscriber#: [id number]    Group#: [id number] Precert#:              History & Physical      Jermain Montano MD at 2018  5:49 AM              [facility] Medicine Services  HISTORY AND PHYSICAL    Patient Name: Dennis Coon  : 1934  MRN: [id number]  Primary Care Physician: Emerita Jorge MD  Date of admission: 2018      Subjective   Subjective     Chief Complaint: chest pain.    HPI:  Dennis Coon is a 84 y.o. male  with a hx of CAD, s/p NSTEMI that was medically treated, hypertension, HLD, Afib on coumadin, CVA, well controlled T2DM. Patient presents to [facility] with acute onset right sided chest pain denied any n/v, no diaphoresis. Patient is drowsy on my evaluation as such family provides most of the history. Per them patient fell down on hos right side 2 weeks ago and has significant bruising W/u in ED included troponin negative x2., EKG had no significant changes from previous. Patient was loaded on Asprin in EMS, here he was put on a nitro gtt and hospital medicine was asked to admit.    Review of Systems   Gen- No fevers, chills  CV- +chest pain, palpitations  Resp- No cough, dyspnea  GI- No N/V/D, abd pain    Otherwise 10-system ROS reviewed and is negative except as mentioned in the HPI.    Personal History     Past Medical History:   Diagnosis Date   • BPH (benign prostatic hypertrophy) 2016   • CVA (cerebral infarction) 2016   • Diabetes mellitus, type 2 (CMS/HCC) 2016   • Extremity pain    • Hypertension 2016   • Hypothyroidism 2016   • Nephrolithiasis    • On continuous oral anticoagulation 2016     Continue Coumadin.   • Paroxysmal  atrial fibrillation (CMS/HCC) 9/22/2016   • TIA (transient ischemic attack) 9/22/2016       Past Surgical History:   Procedure Laterality Date   • CATARACT EXTRACTION     • ESOPHAGEAL DILATATION  1997   • MOUTH SURGERY     • OTHER SURGICAL HISTORY      Squamous cell, occipital area, removal.   • SHOULDER SURGERY Right    • TONSILLECTOMY AND ADENOIDECTOMY     • VASECTOMY  1960       Family History: family history includes No Known Problems in his father and mother. Otherwise pertinent FHx was reviewed and unremarkable.     Social History:  reports that  has never smoked. he has never used smokeless tobacco. He reports that he does not drink alcohol or use drugs.  Social History     Social History Narrative   • Not on file       Medications:  Available home medication information reviewed.    Allergies   Allergen Reactions   • Actos [Pioglitazone]    • Aggrenox [Aspirin-Dipyridamole Er]    • Contrast Dye    • Diovan [Valsartan]    • Iodine    • Lisinopril    • Statins        Objective   Objective     Vital Signs:   Temp:  [97.7 °F (36.5 °C)] 97.7 °F (36.5 °C)  Heart Rate:  [72-74] 72  Resp:  [16] 16  BP: (124-146)/(69-79) 124/69      Physical Exam   Constitutional: No acute distress, drowsy  Eyes: PERRLA, sclerae anicteric, no conjunctival injection  HENT: NCAT, mucous membranes moist  Neck: Supple, no thyromegaly, no lymphadenopathy, trachea midline  Respiratory: Clear to auscultation bilaterally, nonlabored respirations   Cardiovascular: RRR, no murmurs, rubs, or gallops, palpable pedal pulses bilaterally  Gastrointestinal: Positive bowel sounds, soft, nontender, nondistended  Musculoskeletal: No bilateral ankle edema, no clubbing or cyanosis to extremities  Psychiatric: Appropriate affect, cooperative  Neurologic: Oriented x 3, strength symmetric in all extremities, Cranial Nerves grossly intact to confrontation, speech clear  Skin: No rashes    Results Reviewed:  I have personally reviewed current lab, radiology,  and data and agree.    Results from last 7 days   Lab Units  12/17/18   0246   WBC 10*3/mm3  7.42   HEMOGLOBIN g/dL  10.9*   HEMATOCRIT %  33.3*   PLATELETS 10*3/mm3  311   INR   2.55*     Results from last 7 days   Lab Units  12/17/18   0246   SODIUM mmol/L  140   POTASSIUM mmol/L  4.8   CHLORIDE mmol/L  108   CO2 mmol/L  20.0   BUN mg/dL  34*   CREATININE mg/dL  1.66*   GLUCOSE mg/dL  248*   CALCIUM mg/dL  9.7   ALT (SGPT) U/L  12   AST (SGOT) U/L  12     Estimated Creatinine Clearance: 38 mL/min (A) (by C-G formula based on SCr of 1.66 mg/dL (H)).  Brief Urine Lab Results     None        BNP   Date Value Ref Range Status   12/17/2018 80.0 0.0 - 100.0 pg/mL Final     Comment:     Results may be falsely decreased if patient taking Biotin.     Imaging Results (last 24 hours)     Procedure Component Value Units Date/Time    XR Chest 1 View [[id number]] Collected:  12/17/18 0325     Updated:  12/17/18 0440    Narrative:       EXAM:    XR Chest, 1 View     EXAM DATE/TIME:    12/17/2018 3:25 AM     CLINICAL HISTORY:    84 years old, male; Pain; Chest pain; Type not specified; Additional info:   Chest pain triage protocol     TECHNIQUE:    XR of the chest, 1 view.     COMPARISON:    CR XR CHEST 1 VW 9/26/2018 2:55 AM     FINDINGS:    Lungs:  Mild linear bibasal opacities suggestive of subsegmental atelectasis.   Otherwise grossly clear.    Pleural space: Unremarkable. No pneumothorax.    Heart/Mediastinum: Unremarkable.     Bones/joints: Unremarkable.       Impression:       Mild linear bibasal opacities suggestive of subsegmental atelectasis.     THIS DOCUMENT HAS BEEN ELECTRONICALLY SIGNED BY EVELYN FREGOSO MD        Results for orders placed during the hospital encounter of 09/26/18   Adult Transthoracic Echo Complete W/ Cont if Necessary Per Protocol    Narrative · Left ventricular systolic function is normal.  · Estimated EF appears to be in the range of 56 - 60%.  · Left ventricular diastolic (grade I) consistent  with impaired   relaxation.  · Aortic valve area is 1.2 cm2.  · Mild aortic valve regurgitation is present.  · There is moderate calcification of the aortic valve  · Mild-moderate Aortic stenosis  · Aortic valve maximum pressure gradient is 25 mmHg. Aortic valve mean   pressure gradient is 15.1 mmHg  · Small patent foramen ovale present.          Assessment/Plan   Assessment / Plan     Active Hospital Problems    Diagnosis   • **Chest pain, atypical   • CKD (chronic kidney disease)   • NSTEMI (non-ST elevated myocardial infarction) (CMS/[facility])   • Paroxysmal atrial fibrillation (CMS/HCC)     a. Diagnosed in 2008, after CVA, April 2007.   b. Initiation of Coumadin and sotalol, 2008.   c. Abnormal Lexiscan Cardiolite stress test, June 2013, inferior ischemia. EF 63%.   d. Echocardiogram, 08/06/2014, EF 70%, normal left ventricular systolic function, diastolic dysfunction, no significant valvular abnormalities.         • Hypertension   • Diabetes mellitus, type 2 (CMS/[facility])   • History of CVA (cerebrovascular accident)     Lacunar CVA, 2007,  e. PFO on MAXX, 2007: EF 50% to 60%, small atrial septal aneurysm and evidence of right-to-left shunt.  Patient declined PFO closure at the time.        • Hypothyroidism       84 yoM with recent NSTEMI with no intervention, currently on medical management, recent fall, he  p/w acute onset right sided chest pain, suspect this more musculoskeletal but we will r/o cardiac.    Assessment & Plan:  - Chest pain, suspect this is musculoskeletal, it is right sided, non-reproducible, he is s/p fall. However considering his hx of NSTEMI with no intervention, cardiac etiology cannot be completely r/o,EKG is not changed from previous, troponin thus far negative x2, we will trend this, continue home cardiac rx  and consult cardiology to weigh in, he has previously seen by .  - CKD stage 3, Cr currently at baseline, continue to monitor  - Paroxysmal Afib currently on sotalol and  coumadin, INR 2.55  - Well controlled T2DM A1C 7.2% not on any rx, place on ssi  - Continue home rx for chronic medical issues.    DVT prophylaxis:Coumadin    CODE STATUS:    Code Status and Medical Interventions:   Ordered at: 12/17/18 0606     Code Status:    No CPR     Medical Interventions (Level of Support Prior to Arrest):    Comfort Measures       Admission Status:  I believe this patient meets INPATIENT status due to the need for care which can only be reasonably provided in an hospital setting such as aggressive/expedited ancillary services and/or consultation services, the necessity for IV medications, close physician monitoring and/or the possible need for procedures.  In such, I feel patient’s risk for adverse outcomes and need for care warrant INPATIENT evaluation and predict the patient’s care encounter to likely last beyond 2 midnights.    Electronically signed by Jermain Montano MD, 12/17/18, 5:49 AM.        Electronically signed by Jermain Montano MD at 12/17/2018  6:28 AM

## 2018-12-19 NOTE — PLAN OF CARE
Problem: Fall Risk (Adult)  Goal: Identify Related Risk Factors and Signs and Symptoms  Outcome: Ongoing (interventions implemented as appropriate)    Goal: Absence of Fall  Outcome: Ongoing (interventions implemented as appropriate)      Problem: Cardiac: ACS (Acute Coronary Syndrome) (Adult)  Goal: Signs and Symptoms of Listed Potential Problems Will be Absent, Minimized or Managed (Cardiac: ACS)  Outcome: Ongoing (interventions implemented as appropriate)      Problem: Patient Care Overview  Goal: Plan of Care Review  Outcome: Ongoing (interventions implemented as appropriate)   12/18/18 1855   Coping/Psychosocial   Plan of Care Reviewed With patient;family   Plan of Care Review   Progress declining   OTHER   Outcome Summary Pt came to 2A ICU around 1300 after a cardiac cath. R venous sheath in place and transvenous paced. Pt alert and oriented but requiring a lot of oxygen. Could not wean pt on a non-rebreather mask. Per Dr. Nettles, hi-flow NC placed. Hi flow @ 95%, pts sats bw 88-90%. D/t pts wishes to not be intubated, we will try the bipap to increase oxygenation. Dr. Oneal @ bedside this evening to discuss with pt and family about future plans and goals of care. He changed the pacemaker mode from DDD to VVI at bedside and left the set rate at 70. Pt went into a-fib this evening with physician @ bedside; amio protocol ordered. Lasix 60mg ordered and Rebolledo catheter inserted for strict I/O monitoring. Levophed off at 1815. Otherwise, VSS and pt resting comfortably.      Goal: Individualization and Mutuality  Outcome: Ongoing (interventions implemented as appropriate)    Goal: Discharge Needs Assessment  Outcome: Ongoing (interventions implemented as appropriate)    Goal: Interprofessional Rounds/Family Conf  Outcome: Ongoing (interventions implemented as appropriate)      Problem: Cardiac Catheterization (Diagnostic/Interventional) (Adult)  Goal: Signs and Symptoms of Listed Potential Problems Will be  Absent, Minimized or Managed (Cardiac Catheterization)  Outcome: Ongoing (interventions implemented as appropriate)    Goal: Anesthesia/Sedation Recovery  Outcome: Ongoing (interventions implemented as appropriate)      Problem: Skin Injury Risk (Adult)  Goal: Identify Related Risk Factors and Signs and Symptoms  Outcome: Ongoing (interventions implemented as appropriate)    Goal: Skin Health and Integrity  Outcome: Ongoing (interventions implemented as appropriate)      Problem: Cardiac Rhythm Management Device (Adult)  Goal: Signs and Symptoms of Listed Potential Problems Will be Absent, Minimized or Managed (Cardiac Rhythm Management Device)  Outcome: Ongoing (interventions implemented as appropriate)

## 2018-12-19 NOTE — PLAN OF CARE
Problem: Fall Risk (Adult)  Goal: Absence of Fall  Outcome: Ongoing (interventions implemented as appropriate)      Problem: Cardiac: ACS (Acute Coronary Syndrome) (Adult)  Goal: Signs and Symptoms of Listed Potential Problems Will be Absent, Minimized or Managed (Cardiac: ACS)  Outcome: Ongoing (interventions implemented as appropriate)      Problem: Patient Care Overview  Goal: Plan of Care Review  Outcome: Ongoing (interventions implemented as appropriate)   12/19/18 0639   Coping/Psychosocial   Plan of Care Reviewed With patient   Plan of Care Review   Progress no change   OTHER   Outcome Summary VSS. Continues with transvenous pacemaker with rate 70. Continues on Hi Flow NC, but weaned to from 50L/100% to 40L/60%. Good uop. No complaints of pain. Amio gtt continues. Patient mentioned and discussed his wishes several times throughout the night. He hopes to discontinue pacemaker today and go home, if possible, for end of life care.       Problem: Cardiac Catheterization (Diagnostic/Interventional) (Adult)  Goal: Signs and Symptoms of Listed Potential Problems Will be Absent, Minimized or Managed (Cardiac Catheterization)  Outcome: Ongoing (interventions implemented as appropriate)      Problem: Skin Injury Risk (Adult)  Goal: Skin Health and Integrity  Outcome: Ongoing (interventions implemented as appropriate)      Problem: Cardiac Rhythm Management Device (Adult)  Goal: Signs and Symptoms of Listed Potential Problems Will be Absent, Minimized or Managed (Cardiac Rhythm Management Device)  Outcome: Ongoing (interventions implemented as appropriate)

## 2018-12-19 NOTE — PLAN OF CARE
Problem: Patient Care Overview  Goal: Plan of Care Review  Outcome: Ongoing (interventions implemented as appropriate)   12/19/18 9280   Coping/Psychosocial   Plan of Care Reviewed With patient   Plan of Care Review   Progress no change   OTHER   Outcome Summary Pt VSS. Temporary pacemaker removed per cardiology. FC dc'd. OOBTC, no issues. Adequate urine output, lasix given today x 1. D. FREDI Pruett.       Problem: Cardiac Catheterization (Diagnostic/Interventional) (Adult)  Goal: Signs and Symptoms of Listed Potential Problems Will be Absent, Minimized or Managed (Cardiac Catheterization)  Outcome: Ongoing (interventions implemented as appropriate)      Problem: Skin Injury Risk (Adult)  Goal: Identify Related Risk Factors and Signs and Symptoms  Outcome: Outcome(s) achieved Date Met: 12/19/18      Problem: Cardiac Rhythm Management Device (Adult)  Goal: Signs and Symptoms of Listed Potential Problems Will be Absent, Minimized or Managed (Cardiac Rhythm Management Device)  Outcome: Ongoing (interventions implemented as appropriate)

## 2018-12-19 NOTE — DISCHARGE SUMMARY
Date of Discharge:  12/19/2018    Discharge Diagnosis: Non-STEMI and congestive heart failure    Presenting Problem/History of Present Illness  NSTEMI (non-ST elevated myocardial infarction) (CMS/[facility]) [I21.4]     84 y.o. male brought to [facility] ED on 12/17 with complaints of right-sided chest pain unrelieved with SL nitroglycerin. EKG demonstrated pako-lateral ST depression more pronounced from previous readings in November. He did sustain fall ~10 days prior predominantly right-sided that is associated with extensive bruising. CXR did reveal nondisplaced left lateral 9th rib fracture, but nothing on the right. Also patchy airspace disease suggestive of edema or PNA and was administered Lasix. Initial troponins were negative. Cardiology was consulted with plan for LHC once INR acceptable (2.55 on admission). He was initiated on a Nitro gtt and admitted to the Hospitalist service.     Hospital Course    Throughout the evening Mr. Coon's troponins continued to increase to a max 33.63. He was premedicated with prednisone and benadryl for contrast dye allergy and on 12/18 underwent LHC per Dr. Jewell revealing severe MV CAD involving the left main, LAD, ramus intermedius, and RCA. A temporary pacemaker was placed for complete heart block and he was initiated on norepinephrine for hypotension. He was transferred to the ICU for higher level of care.     His rhythm transitioned to atrial fibrillation and the temporary pacemaker was removed.  The patient decided he wanted no further intervention from a cardiac standpoint and requested transition to hospice care and then discharge home with hospice.  This was discussed in detail with the patient and his daughter as well as the hospice team.    Procedures Performed  Procedure(s):  LEFT HEART CATH       Consults:   Consults     Date and Time Order Name Status Description    12/19/2018 0902 Inpatient Palliative Care MD Consult              Condition on Discharge:   "poor    Vital Signs  Temp:  [96.6 °F (35.9 °C)-97.8 °F (36.6 °C)] 96.6 °F (35.9 °C)  Heart Rate:  [] 85  Resp:  [14-20] 18  BP: ()/(41-90) 105/65    Physical Exam:   Objective:  General Appearance:  In no acute distress.    Vital signs: (most recent): Blood pressure 105/65, pulse 85, temperature 96.6 °F (35.9 °C), temperature source Axillary, resp. rate 18, height 180.3 cm (71\"), weight 90.3 kg (199 lb), SpO2 93 %.    HEENT: Normal HEENT exam.  (HFNC)    Lungs:  Normal effort and normal respiratory rate.  He is not in respiratory distress.  There are rales and decreased breath sounds.  No wheezes or rhonchi.  (Basilar crackles)  Heart: Normal rate.  Irregular rhythm.  S1 normal and S2 normal.  No murmur, gallop or friction rub.   Chest: Symmetric chest wall expansion.   Abdomen: Abdomen is soft and non-distended.  Bowel sounds are normal.   There is no abdominal tenderness.   There is no mass. There is no splenomegaly. There is no hepatomegaly.   Extremities: There is no deformity or dependent edema.    Neurological: Patient is alert and oriented to person, place and time.    Pupils:  Pupils are equal, round, and reactive to light.    Skin:  Warm and dry.                Discharge Disposition  Hospice/Medical Facility ([facility])    Discharge Medications     Discharge Medications      ASK your doctor about these medications      Instructions Start Date   acetaminophen 325 MG tablet  Commonly known as:  TYLENOL   650 mg, Oral, Every 6 Hours PRN      ALLEGRA ALLERGY PO   1 tablet, Oral, As Needed      amLODIPine 10 MG tablet  Commonly known as:  NORVASC   10 mg, Oral, Daily      clopidogrel 75 MG tablet  Commonly known as:  PLAVIX   75 mg, Oral, Daily      Cream Base cream   CAPSAICIN 0.001%, IMIPRAMINE 3%, LIDOCAINE 10%, MANNITOL 20%, MELOXICAM 0.1%, PRILOCAINE 2% APPLY 1-2 GRAMS 3-4 TIMES DAILY      isosorbide mononitrate 120 MG 24 hr tablet  Commonly known as:  IMDUR   120 mg, Oral, Daily    "   levothyroxine 50 MCG tablet  Commonly known as:  SYNTHROID, LEVOTHROID   50 mcg, Oral, Daily      nitroglycerin 0.4 MG SL tablet  Commonly known as:  NITROSTAT   0.4 mg, Sublingual, Every 5 Minutes PRN, Take no more than 3 doses in 15 minutes.       pantoprazole 40 MG EC tablet  Commonly known as:  PROTONIX   40 mg, Oral, Daily      ranolazine 500 MG 12 hr tablet  Commonly known as:  RANEXA   500 mg, Oral, Every 12 Hours Scheduled      sotalol 80 MG tablet  Commonly known as:  BETAPACE   80 mg, Oral, Every 24 Hours Scheduled      warfarin 4 MG tablet  Commonly known as:  COUMADIN   4 mg, Oral, See Admin Instructions, Monday, Tuesday, Wednesday, Thursday, Friday, Saturday       warfarin 5 MG tablet  Commonly known as:  COUMADIN   5 mg, Oral, Nightly             Discharge Diet:     Activity at Discharge:     Code Status at Discharge:    Current Code Status     Date Active Code Status Order ID Comments User Context       Prior          Follow-up Appointments  Future Appointments   Date Time Provider Department Center   2/19/2019  1:30 PM Siddhartha Oneal MD [facility] None         Test Results Pending at Discharge       César Maier MD  Pulmonary and Critical Care Medicine  12/19/18  4:17 PM    Time: Discharge 20 min

## 2018-12-19 NOTE — PROGRESS NOTES
"[facility] Progress Note     LOS: 2 days   Patient Care Team:  Emerita Jorge MD as PCP - General  Emerita Jorge MD as PCP - Claims Attributed  Nestor Carrillo MD as Consulting Physician (Pain Medicine)  Rambo Mclean MD as Consulting Physician (Cardiology)  PCP:  Emerita Jorge MD    Chief Complaint:  Follow up multi vessel CAD    Subjective: Patient and his family had discussions regarding treatment options and goals of care.  Mr. Coon has decided he does not want to pursue left main PCI.  He'd like to have the transvenous pacer removed if possible and try to coordinate going home with hospice      Review of Systems:   All systems have been reviewed and are negative with the exception of those mentioned above.      Objective:    Vital Sign Min/Max for last 24 hours  Temp  Min: 96.6 °F (35.9 °C)  Max: 97.8 °F (36.6 °C)   BP  Min: 83/59  Max: 158/83   Pulse  Min: 69  Max: 101   Resp  Min: 14  Max: 20   SpO2  Min: 83 %  Max: 100 %   No Data Recorded   Weight  Min: 90.3 kg (199 lb)  Max: 90.3 kg (199 lb)     Flowsheet Rows      First Filed Value   Admission Height  177.8 cm (70\") Documented at 12/17/2018 0236   Admission Weight  93 kg (205 lb) Documented at 12/17/2018 0236          Telemetry: Sinus rhythm with left bundle branch block now present      Intake/Output Summary (Last 24 hours) at 12/19/2018 0935  Last data filed at 12/19/2018 0800  Gross per 24 hour   Intake 546.85 ml   Output 1450 ml   Net -903.15 ml     Intake & Output (last 3 days)       12/16 0701 - 12/17 0700 12/17 0701 - 12/18 0700 12/18 0701 - 12/19 0700 12/19 0701 - 12/20 0700    P.O.  240 100     I.V. (mL/kg)   406.9 (4.5)     Other   40     Total Intake(mL/kg)  240 (2.7) 546.9 (6.1)     Urine (mL/kg/hr)  1380 (0.6) 1450 (0.7) 100 (0.4)    Total Output  1380 1450 100    Net  -1140 -903.2 -100                   Physical Exam:  Physical Exam Constitutional: He is oriented " to person, place, and time. He appears well-developed and well-nourished. No distress.   Neck: No JVD present.   Cardiovascular: Normal rate and intact distal pulses.   2/6 systolic murmur  Pulmonary/Chest: Effort normal. He has rales (bibasilar)-improving  Abdominal: Soft. There is no tenderness.   Musculoskeletal: Normal range of motion. He exhibits no edema.   Extremities warm and well-perfused   Neurological: He is alert and oriented to person, place, and time.   Skin: Skin is warm and dry.            LABS/DIAGNOSTIC DATA:  Results from last 7 days   Lab Units  12/19/18   0514  12/18/18   0618  12/17/18   0246   WBC 10*3/mm3  17.42*  10.72  7.42   HEMOGLOBIN g/dL  11.4*  10.3*  10.9*   HEMATOCRIT %  34.6*  31.5*  33.3*   PLATELETS 10*3/mm3  330  316  311     No results found for: TROPONINT  Results from last 7 days   Lab Units  12/18/18 1819 12/18/18 0618  12/17/18   0246   INR   2.80*  2.42*  2.55*     Results from last 7 days   Lab Units  12/19/18   0514  12/18/18 1819 12/18/18   0618  12/17/18   0246   SODIUM mmol/L  139  138  138  140   POTASSIUM mmol/L  4.2  4.9  4.4  4.8   CHLORIDE mmol/L  105  107  105  108   CO2 mmol/L  23.0  18.0*  21.0  20.0   BUN mg/dL  56*  49*  42*  34*   CREATININE mg/dL  2.19*  1.94*  1.92*  1.66*   CALCIUM mg/dL  9.3  9.1  9.4  9.7   BILIRUBIN mg/dL  0.4  0.7   --   0.4   ALK PHOS U/L  88  93   --   100   ALT (SGPT) U/L  29  30   --   12   AST (SGOT) U/L  61*  70*   --   12   GLUCOSE mg/dL  194*  213*  220*  248*                 Results from last 7 days   Lab Units  12/18/18   1819 12/17/18   0246   BNP pg/mL  956.0*  80.0       Medication Review:     aspirin 81 mg Oral Daily   clopidogrel 75 mg Oral Daily   furosemide 60 mg Intravenous Once   insulin lispro 0-7 Units Subcutaneous 4x Daily With Meals & Nightly   isosorbide mononitrate 120 mg Oral Daily   levothyroxine 50 mcg Oral Q AM   pantoprazole 40 mg Oral Daily   sodium chloride 3 mL Intravenous Q12H         norepinephrine 0.02-0.3 mcg/kg/min Last Rate: Stopped (12/18/18 1815)          NSTEMI    Paroxysmal atrial fibrillation     Hypertension    Diabetes mellitus, type 2     History of CVA (cerebrovascular accident)    Hypothyroidism    Chronic anticoagulation on warfarin     Acute kidney injury superimposed on chronic kidney disease (CMS/HCC)    MV CAD with unstable angina pectoris    Closed fracture of the left 9th rib     Acute systolic (congestive) heart failure (CMS/HCC)    Complete heart block (CMS/HCC)      Assessment/Plan:    1. NSTEMI/mulitvessel CAD/ Left main disease/cardiogenic shock     -troponin significantly elevated to 33  -Coronary angiography showed an occluded right coronary artery and a distal left main bifurcation lesion which extended into the LAD  -During the procedure patient developed complete heart block which required temporary transvenous pacing  -Multiple discussions with the family by myself and Dr. Jewell, detailing options for possible high risk intervention, potentially involving and pelvis support.  Versus continued medical management with interval min of palliative and hospice care.  Patient and family has decided that he would not like to pursue additional procedures and would like to pursue hospice and palliative care at this time  -recent NSTEMI 9/2018, at the time patient and wife opted for medical management given his age,   -continue ASA and plavix  -continue to hold warfarin  -CP free at this time  -prn nitro for CP  --He was able to be weaned off of pressors hemodynamically stable, heart block is resolved the temporary transvenous pacer was removed by me this morning  -appreciate palliative assistance  -Repeat Lasix 60 mg IV for dyspnea and pulmonary edema, which is improving     2. Paroxysmal Atrial Fibrillation   -Had episode of A. fib last night received IV amiodarone with conversion to sinus remains in sinus  -Hold amiodarone given that pacemaker has been removed and do  not want to precipitate heart block     3.  Mild-moderate aortic stenosis  -stable       4.  IDRIS on CKD  -Likely cardiorenal in nature, monitor, receiving diuresis today for persistent pulmonary edema    Will continue to follow, appreciate palliative care assistance.  Case discussed with Dr. Darrion Oneal MD   12/19/18  9:35 AM

## 2018-12-20 LAB
INR PPP: 1.66 (ref 0.91–1.09)
PROTHROMBIN TIME: 17.4 SECONDS (ref 9.6–11.5)

## 2018-12-20 PROCEDURE — 85610 PROTHROMBIN TIME: CPT | Performed by: INTERNAL MEDICINE

## 2018-12-20 RX ORDER — WARFARIN SODIUM 5 MG/1
5 TABLET ORAL
Status: DISCONTINUED | OUTPATIENT
Start: 2018-12-20 | End: 2018-12-27 | Stop reason: HOSPADM

## 2018-12-20 RX ORDER — LORAZEPAM 0.5 MG/1
0.5 TABLET ORAL EVERY 6 HOURS PRN
Status: DISCONTINUED | OUTPATIENT
Start: 2018-12-20 | End: 2018-12-27

## 2018-12-20 RX ORDER — OXYCODONE HYDROCHLORIDE 5 MG/1
5 TABLET ORAL EVERY 4 HOURS PRN
Status: DISCONTINUED | OUTPATIENT
Start: 2018-12-20 | End: 2018-12-27 | Stop reason: HOSPADM

## 2018-12-20 RX ADMIN — ASPIRIN 81 MG: 81 TABLET, COATED ORAL at 07:45

## 2018-12-20 RX ADMIN — WARFARIN SODIUM 5 MG: 5 TABLET ORAL at 17:58

## 2018-12-20 RX ADMIN — SODIUM CHLORIDE, PRESERVATIVE FREE 3 ML: 5 INJECTION INTRAVENOUS at 21:32

## 2018-12-20 RX ADMIN — CLOPIDOGREL 75 MG: 75 TABLET, FILM COATED ORAL at 07:45

## 2018-12-20 RX ADMIN — PANTOPRAZOLE SODIUM 40 MG: 40 TABLET, DELAYED RELEASE ORAL at 07:46

## 2018-12-20 RX ADMIN — LEVOTHYROXINE SODIUM 50 MCG: 50 TABLET ORAL at 07:45

## 2018-12-20 RX ADMIN — AMIODARONE HYDROCHLORIDE 200 MG: 200 TABLET ORAL at 07:46

## 2018-12-20 RX ADMIN — SODIUM CHLORIDE, PRESERVATIVE FREE 3 ML: 5 INJECTION INTRAVENOUS at 07:48

## 2018-12-21 LAB — GLUCOSE BLDC GLUCOMTR-MCNC: 171 MG/DL (ref 70–130)

## 2018-12-21 PROCEDURE — 82962 GLUCOSE BLOOD TEST: CPT

## 2018-12-21 PROCEDURE — 97161 PT EVAL LOW COMPLEX 20 MIN: CPT

## 2018-12-21 RX ADMIN — ASPIRIN 81 MG: 81 TABLET, COATED ORAL at 08:56

## 2018-12-21 RX ADMIN — AMIODARONE HYDROCHLORIDE 200 MG: 200 TABLET ORAL at 08:56

## 2018-12-21 RX ADMIN — CLOPIDOGREL 75 MG: 75 TABLET, FILM COATED ORAL at 08:56

## 2018-12-21 RX ADMIN — WARFARIN SODIUM 5 MG: 5 TABLET ORAL at 17:15

## 2018-12-21 RX ADMIN — PANTOPRAZOLE SODIUM 40 MG: 40 TABLET, DELAYED RELEASE ORAL at 08:56

## 2018-12-21 RX ADMIN — LEVOTHYROXINE SODIUM 50 MCG: 50 TABLET ORAL at 05:58

## 2018-12-21 RX ADMIN — SODIUM CHLORIDE, PRESERVATIVE FREE 3 ML: 5 INJECTION INTRAVENOUS at 08:56

## 2018-12-22 LAB
GLUCOSE BLDC GLUCOMTR-MCNC: 271 MG/DL (ref 70–130)
INR PPP: 1.42 (ref 0.91–1.09)
PROTHROMBIN TIME: 14.9 SECONDS (ref 9.6–11.5)

## 2018-12-22 PROCEDURE — 82962 GLUCOSE BLOOD TEST: CPT

## 2018-12-22 PROCEDURE — 97110 THERAPEUTIC EXERCISES: CPT

## 2018-12-22 PROCEDURE — 85610 PROTHROMBIN TIME: CPT

## 2018-12-22 RX ADMIN — WARFARIN SODIUM 5 MG: 5 TABLET ORAL at 17:20

## 2018-12-22 RX ADMIN — CLOPIDOGREL 75 MG: 75 TABLET, FILM COATED ORAL at 08:42

## 2018-12-22 RX ADMIN — PANTOPRAZOLE SODIUM 40 MG: 40 TABLET, DELAYED RELEASE ORAL at 08:41

## 2018-12-22 RX ADMIN — LEVOTHYROXINE SODIUM 50 MCG: 50 TABLET ORAL at 06:37

## 2018-12-22 RX ADMIN — LORAZEPAM 0.5 MG: 0.5 TABLET ORAL at 23:06

## 2018-12-22 RX ADMIN — OXYCODONE HYDROCHLORIDE 5 MG: 5 TABLET ORAL at 23:05

## 2018-12-22 RX ADMIN — AMIODARONE HYDROCHLORIDE 200 MG: 200 TABLET ORAL at 08:41

## 2018-12-22 RX ADMIN — ASPIRIN 81 MG: 81 TABLET, COATED ORAL at 08:41

## 2018-12-22 RX ADMIN — SODIUM CHLORIDE, PRESERVATIVE FREE 3 ML: 5 INJECTION INTRAVENOUS at 08:42

## 2018-12-22 RX ADMIN — SODIUM CHLORIDE, PRESERVATIVE FREE 3 ML: 5 INJECTION INTRAVENOUS at 20:06

## 2018-12-23 LAB
INR PPP: 1.66 (ref 0.91–1.09)
PROTHROMBIN TIME: 17.4 SECONDS (ref 9.6–11.5)

## 2018-12-23 PROCEDURE — 85610 PROTHROMBIN TIME: CPT

## 2018-12-23 PROCEDURE — 97110 THERAPEUTIC EXERCISES: CPT

## 2018-12-23 PROCEDURE — 97116 GAIT TRAINING THERAPY: CPT

## 2018-12-23 RX ADMIN — AMIODARONE HYDROCHLORIDE 200 MG: 200 TABLET ORAL at 08:08

## 2018-12-23 RX ADMIN — WARFARIN SODIUM 5 MG: 5 TABLET ORAL at 17:44

## 2018-12-23 RX ADMIN — SODIUM CHLORIDE, PRESERVATIVE FREE 3 ML: 5 INJECTION INTRAVENOUS at 08:10

## 2018-12-23 RX ADMIN — SODIUM CHLORIDE, PRESERVATIVE FREE 3 ML: 5 INJECTION INTRAVENOUS at 21:21

## 2018-12-23 RX ADMIN — LEVOTHYROXINE SODIUM 50 MCG: 50 TABLET ORAL at 06:21

## 2018-12-23 RX ADMIN — CLOPIDOGREL 75 MG: 75 TABLET, FILM COATED ORAL at 08:08

## 2018-12-23 RX ADMIN — ASPIRIN 81 MG: 81 TABLET, COATED ORAL at 08:09

## 2018-12-23 RX ADMIN — PANTOPRAZOLE SODIUM 40 MG: 40 TABLET, DELAYED RELEASE ORAL at 08:08

## 2018-12-24 LAB
GLUCOSE BLDC GLUCOMTR-MCNC: 150 MG/DL (ref 70–130)
INR PPP: 1.83 (ref 0.91–1.09)
PROTHROMBIN TIME: 19.2 SECONDS (ref 9.6–11.5)

## 2018-12-24 PROCEDURE — 97110 THERAPEUTIC EXERCISES: CPT

## 2018-12-24 PROCEDURE — 82962 GLUCOSE BLOOD TEST: CPT

## 2018-12-24 PROCEDURE — 85610 PROTHROMBIN TIME: CPT

## 2018-12-24 PROCEDURE — 97116 GAIT TRAINING THERAPY: CPT

## 2018-12-24 PROCEDURE — 25010000002 HYDROMORPHONE PER 4 MG: Performed by: INTERNAL MEDICINE

## 2018-12-24 RX ADMIN — AMIODARONE HYDROCHLORIDE 200 MG: 200 TABLET ORAL at 08:48

## 2018-12-24 RX ADMIN — SODIUM CHLORIDE, PRESERVATIVE FREE 3 ML: 5 INJECTION INTRAVENOUS at 08:48

## 2018-12-24 RX ADMIN — LEVOTHYROXINE SODIUM 50 MCG: 50 TABLET ORAL at 06:13

## 2018-12-24 RX ADMIN — CLOPIDOGREL 75 MG: 75 TABLET, FILM COATED ORAL at 08:48

## 2018-12-24 RX ADMIN — NITROGLYCERIN 0.4 MG: 0.4 TABLET SUBLINGUAL at 20:21

## 2018-12-24 RX ADMIN — PANTOPRAZOLE SODIUM 40 MG: 40 TABLET, DELAYED RELEASE ORAL at 08:48

## 2018-12-24 RX ADMIN — HYDROMORPHONE HYDROCHLORIDE 0.5 MG: 1 INJECTION, SOLUTION INTRAMUSCULAR; INTRAVENOUS; SUBCUTANEOUS at 20:19

## 2018-12-24 RX ADMIN — SODIUM CHLORIDE, PRESERVATIVE FREE 3 ML: 5 INJECTION INTRAVENOUS at 20:19

## 2018-12-24 RX ADMIN — ASPIRIN 81 MG: 81 TABLET, COATED ORAL at 08:48

## 2018-12-24 RX ADMIN — WARFARIN SODIUM 5 MG: 5 TABLET ORAL at 17:56

## 2018-12-25 LAB
GLUCOSE BLDC GLUCOMTR-MCNC: 221 MG/DL (ref 70–130)
INR PPP: 1.91 (ref 0.91–1.09)
PROTHROMBIN TIME: 20.1 SECONDS (ref 9.6–11.5)

## 2018-12-25 PROCEDURE — 82962 GLUCOSE BLOOD TEST: CPT

## 2018-12-25 PROCEDURE — 85610 PROTHROMBIN TIME: CPT

## 2018-12-25 RX ADMIN — SODIUM CHLORIDE, PRESERVATIVE FREE 3 ML: 5 INJECTION INTRAVENOUS at 20:01

## 2018-12-25 RX ADMIN — AMIODARONE HYDROCHLORIDE 200 MG: 200 TABLET ORAL at 08:16

## 2018-12-25 RX ADMIN — SODIUM CHLORIDE, PRESERVATIVE FREE 3 ML: 5 INJECTION INTRAVENOUS at 08:18

## 2018-12-25 RX ADMIN — ASPIRIN 81 MG: 81 TABLET, COATED ORAL at 08:16

## 2018-12-25 RX ADMIN — PANTOPRAZOLE SODIUM 40 MG: 40 TABLET, DELAYED RELEASE ORAL at 08:16

## 2018-12-25 RX ADMIN — CLOPIDOGREL 75 MG: 75 TABLET, FILM COATED ORAL at 08:16

## 2018-12-25 RX ADMIN — LEVOTHYROXINE SODIUM 50 MCG: 50 TABLET ORAL at 05:09

## 2018-12-25 RX ADMIN — WARFARIN SODIUM 5 MG: 5 TABLET ORAL at 17:19

## 2018-12-26 LAB
GLUCOSE BLDC GLUCOMTR-MCNC: 157 MG/DL (ref 70–130)
INR PPP: 2.07 (ref 0.91–1.09)
PROTHROMBIN TIME: 21.7 SECONDS (ref 9.6–11.5)

## 2018-12-26 PROCEDURE — 82962 GLUCOSE BLOOD TEST: CPT

## 2018-12-26 PROCEDURE — 85610 PROTHROMBIN TIME: CPT

## 2018-12-26 RX ADMIN — LEVOTHYROXINE SODIUM 50 MCG: 50 TABLET ORAL at 06:39

## 2018-12-26 RX ADMIN — SODIUM CHLORIDE, PRESERVATIVE FREE 3 ML: 5 INJECTION INTRAVENOUS at 10:00

## 2018-12-26 RX ADMIN — PANTOPRAZOLE SODIUM 40 MG: 40 TABLET, DELAYED RELEASE ORAL at 08:57

## 2018-12-26 RX ADMIN — AMIODARONE HYDROCHLORIDE 200 MG: 200 TABLET ORAL at 08:57

## 2018-12-26 RX ADMIN — CLOPIDOGREL 75 MG: 75 TABLET, FILM COATED ORAL at 08:57

## 2018-12-26 RX ADMIN — SODIUM CHLORIDE, PRESERVATIVE FREE 3 ML: 5 INJECTION INTRAVENOUS at 20:00

## 2018-12-26 RX ADMIN — ASPIRIN 81 MG: 81 TABLET, COATED ORAL at 08:58

## 2018-12-26 RX ADMIN — WARFARIN SODIUM 5 MG: 5 TABLET ORAL at 18:32

## 2018-12-27 VITALS
HEART RATE: 68 BPM | WEIGHT: 199.08 LBS | TEMPERATURE: 97.7 F | RESPIRATION RATE: 16 BRPM | SYSTOLIC BLOOD PRESSURE: 118 MMHG | DIASTOLIC BLOOD PRESSURE: 56 MMHG | OXYGEN SATURATION: 96 % | BODY MASS INDEX: 27.87 KG/M2 | HEIGHT: 71 IN

## 2018-12-27 LAB
INR PPP: 2.27 (ref 0.91–1.09)
PROTHROMBIN TIME: 23.8 SECONDS (ref 9.6–11.5)

## 2018-12-27 PROCEDURE — 97110 THERAPEUTIC EXERCISES: CPT

## 2018-12-27 PROCEDURE — 85610 PROTHROMBIN TIME: CPT

## 2018-12-27 PROCEDURE — 97116 GAIT TRAINING THERAPY: CPT

## 2018-12-27 RX ORDER — LORAZEPAM 0.5 MG/1
0.5 TABLET ORAL EVERY 4 HOURS PRN
Status: DISCONTINUED | OUTPATIENT
Start: 2018-12-27 | End: 2018-12-27 | Stop reason: HOSPADM

## 2018-12-27 RX ORDER — RANOLAZINE 500 MG/1
500 TABLET, EXTENDED RELEASE ORAL EVERY 12 HOURS SCHEDULED
Status: DISCONTINUED | OUTPATIENT
Start: 2018-12-28 | End: 2018-12-27

## 2018-12-27 RX ORDER — AMIODARONE HYDROCHLORIDE 200 MG/1
200 TABLET ORAL
Start: 2018-12-28

## 2018-12-27 RX ORDER — WARFARIN SODIUM 5 MG/1
TABLET ORAL
Start: 2018-12-27

## 2018-12-27 RX ORDER — LORAZEPAM 0.5 MG/1
0.5 TABLET ORAL EVERY 4 HOURS PRN
Start: 2018-12-27

## 2018-12-27 RX ORDER — OXYCODONE HYDROCHLORIDE 5 MG/1
5 TABLET ORAL EVERY 4 HOURS PRN
Start: 2018-12-27 | End: 2018-12-30

## 2018-12-27 RX ORDER — ASPIRIN 81 MG/1
81 TABLET ORAL DAILY
Start: 2018-12-28

## 2018-12-27 RX ADMIN — AMIODARONE HYDROCHLORIDE 200 MG: 200 TABLET ORAL at 08:31

## 2018-12-27 RX ADMIN — SODIUM CHLORIDE, PRESERVATIVE FREE 3 ML: 5 INJECTION INTRAVENOUS at 08:32

## 2018-12-27 RX ADMIN — PANTOPRAZOLE SODIUM 40 MG: 40 TABLET, DELAYED RELEASE ORAL at 08:31

## 2018-12-27 RX ADMIN — LEVOTHYROXINE SODIUM 50 MCG: 50 TABLET ORAL at 05:57

## 2018-12-27 RX ADMIN — ASPIRIN 81 MG: 81 TABLET, COATED ORAL at 08:31

## 2018-12-27 RX ADMIN — CLOPIDOGREL 75 MG: 75 TABLET, FILM COATED ORAL at 08:31

## 2019-02-18 ENCOUNTER — TELEPHONE (OUTPATIENT)
Dept: CARDIOLOGY | Facility: CLINIC | Age: 84
End: 2019-02-18

## 2019-02-18 NOTE — TELEPHONE ENCOUNTER
Called patient to remind them of their appointment on 02/19/2019 @1013 . Told patient to arrive 30 minutes prior to appointment and bring detailed med list or medication bottles with them.    Patient's wife stated that they would need to cancel. Patient is still homebound and would probably be calling in Hospice.
